# Patient Record
Sex: MALE | Race: WHITE | Employment: FULL TIME | URBAN - METROPOLITAN AREA
[De-identification: names, ages, dates, MRNs, and addresses within clinical notes are randomized per-mention and may not be internally consistent; named-entity substitution may affect disease eponyms.]

---

## 2017-06-05 ENCOUNTER — GENERIC CONVERSION - ENCOUNTER (OUTPATIENT)
Dept: OTHER | Facility: OTHER | Age: 42
End: 2017-06-05

## 2017-06-06 ENCOUNTER — GENERIC CONVERSION - ENCOUNTER (OUTPATIENT)
Dept: OTHER | Facility: OTHER | Age: 42
End: 2017-06-06

## 2017-07-03 ENCOUNTER — ALLSCRIPTS OFFICE VISIT (OUTPATIENT)
Dept: OTHER | Facility: OTHER | Age: 42
End: 2017-07-03

## 2017-07-03 DIAGNOSIS — Z13.6 ENCOUNTER FOR SCREENING FOR CARDIOVASCULAR DISORDERS: ICD-10-CM

## 2017-09-11 DIAGNOSIS — E78.2 MIXED HYPERLIPIDEMIA: ICD-10-CM

## 2017-09-21 ENCOUNTER — ALLSCRIPTS OFFICE VISIT (OUTPATIENT)
Dept: OTHER | Facility: OTHER | Age: 42
End: 2017-09-21

## 2017-11-13 ENCOUNTER — ALLSCRIPTS OFFICE VISIT (OUTPATIENT)
Dept: OTHER | Facility: OTHER | Age: 42
End: 2017-11-13

## 2017-11-14 NOTE — PROGRESS NOTES
Assessment    1  Vertigo (780 4) (R42)    Plan  Vertigo    · PredniSONE 10 MG Oral Tablet; TAKE 6 TABLETS TODAY, THEN DECREASE BY 1TABLET EACH DAY UNTIL GONE    Discussion/Summary    Call in one week if not improving  The patient was counseled regarding diagnostic results,-- instructions for management,-- prognosis  Possible side effects of new medications were reviewed with the patient/guardian today  The treatment plan was reviewed with the patient/guardian  The patient/guardian understands and agrees with the treatment plan      Chief Complaint    1  Dizziness  Patient is in the office today complaining of having dizziness      History of Present Illness  Vertigo (Brief): The patient is being seen for an initial evaluation of vertigo  The patient presents with complaints of gradual onset of intermittent episodes of moderate sensation of movement  Episodes started 1 week ago  Symptoms are improving  The patient is currently experiencing symptoms  No associated symptoms are reported  Review of Systems   Constitutional: no fever or chills, feels well, no tiredness, no recent weight loss or weight gain  ENT: no complaints of earache, no loss of hearing, no nosebleeds or nasal discharge, no sore throat or hoarseness  Cardiovascular: no complaints of slow or fast heart rate, no chest pain, no palpitations, no leg claudication or lower extremity edema  Respiratory: no complaints of shortness of breath, no wheezing or cough, no dyspnea on exertion, no orthopnea or PND  Gastrointestinal: no complaints of abdominal pain, no constipation, no nausea or vomiting, no diarrhea or bloody stools  Genitourinary: no complaints of dysuria or incontinence, no hesitancy, no nocturia, no genital lesion, no inadequacy of penile erection  Musculoskeletal: no complaints of arthralgia, no myalgia, no joint swelling or stiffness, no limb pain or swelling  Neurological: as noted in HPI  Active Problems  1   Acute bronchitis, unspecified organism (466 0) (J20 9)   2  Allergic rhinitis (477 9) (J30 9)   3  Arthralgia (719 40) (M25 50)   4  Back pain (724 5) (M54 9)   5  Degeneration, intervertebral disc, lumbar (722 52) (M51 36)   6  Hyperlipidemia, mixed (272 2) (E78 2)   7  Laryngitis, acute (464 00) (J04 0)   8  Lumbar radiculopathy (724 4) (M54 16)    Past Medical History  1  History of Acute maxillary sinusitis, unspecified (461 0) (J01 00)   2  History of Acute URI (465 9) (J06 9)   3  History of Cellulitis of toe (681 10) (L03 039)   4  History of acute bronchitis (V12 69) (Z87 09)   5  History of acute pharyngitis (V12 69) (Z87 09)   6  History of influenza vaccination (V49 89) (Z92 29)   7  History of upper respiratory infection (V12 09) (Z87 09)   8  History of Need for Tdap vaccination (V06 1) (Z23)   9  History of Recurrent acute serous otitis media of both ears (381 01) (H65 06)   10  History of Right foot pain (729 5) (M79 671)   11  History of Screening for cardiovascular condition (V81 2) (Z13 6)   12  History of Screening for diabetes mellitus (V77 1) (Z13 1)   13  History of Screening for hyperlipidemia (V77 91) (Z13 220)   14  History of Sinusitis (473 9) (J32 9)   15  History of Temporomandibular joint tender (524 62) (M26 629)    Family History  Mother    1  Denied: Family history of mental disorder   2  Denied: Family history of substance abuse  Father    3  Family history of Diabetes   4  Denied: Family history of mental disorder   5  Denied: Family history of substance abuse  Sister    6  Family history of Colon cancer    Social History   · Consumes alcohol occasionally (V49 89) (Z78 9)   · Denied: History of Drug use   · Never a smoker   · Never smoked    Surgical History    1  History of Inguinal Hernia Repair   2  History of Surgery Vas Deferens Vasectomy    Current Meds   1  Advil 200 MG Oral Tablet; 2 pills 3 times daily; Therapy: 35SBM7010 to (Last Rx:[id number]) Ordered   2   Fluticasone Propionate 50 MCG/ACT Nasal Suspension; use 2 sprays in each nostril once daily; Therapy: 23Voo5063 to [phone])  Requested for: 49WJC6418; Last Rx:2017 Ordered    Allergies    1  No Known Drug Allergies    Vitals   ** Printed in Appendix #1 below  Physical Exam   Constitutional  General appearance: No acute distress, well appearing and well nourished  Ears, Nose, Mouth, and Throat  Oropharynx: Normal with no erythema, edema, exudate or lesions  Pulmonary  Auscultation of lungs: Clear to auscultation, equal breath sounds bilaterally, no wheezes, no rales, no rhonci  Cardiovascular  Auscultation of heart: Normal rate and rhythm, normal S1 and S2, without murmurs  Examination of extremities for edema and/or varicosities: Normal    Abdomen  Abdomen: Non-tender, no masses  Musculoskeletal  Gait and station: Normal    Neurologic  Cranial nerves: Cranial nerves 2-12 intact     Psychiatric  Orientation to person, place and time: Normal    Mood and affect: Normal          Signatures   Electronically signed by : Ga Qureshi DO; 2017  1:46PM EST                       (Author)    Appendix #1  Patient: Rosalva Leon; : 1975; MRN: [id number]   Recorded: 01IUP1644 01:34PM Recorded: 41ZMA6724 01:33PM Recorded: 68OEA1069 01:30PM   Temperature   97 3 F   Heart Rate 91 95 85   Respiration   15   Systolic 700, LUE, Sitting 108, LUE, Standing 118, LUE, Supine   Diastolic 74, LUE, Sitting 76, LUE, Standing 76, LUE, Supine   Height   6 ft 1 in   Weight   193 lb 4 oz   BMI Calculated   25 5   BSA Calculated   2 12   O2 Saturation 97 98 97

## 2017-12-28 ENCOUNTER — ALLSCRIPTS OFFICE VISIT (OUTPATIENT)
Dept: OTHER | Facility: OTHER | Age: 42
End: 2017-12-28

## 2017-12-29 NOTE — PROGRESS NOTES
Assessment   1  Acute infection of nasal sinus (461 9) (J01 90)    Plan   Acute infection of nasal sinus    · Amoxicillin-Pot Clavulanate 875-125 MG Oral Tablet; TAKE 1 TABLET EVERY 12    HOURS DAILY    Discussion/Summary      Patient is to continue Flonase and Mucinex  He is to also use saline nasal spray through the day  He is to start Augmentin twice a day for 10 days  If he is not improving he is to follow up  The patient was counseled regarding instructions for management,-- risk factor reductions,-- patient and family education,-- impressions,-- risks and benefits of treatment options,-- importance of compliance with treatment  Educational resources provided:    Possible side effects of new medications were reviewed with the patient/guardian today  The treatment plan was reviewed with the patient/guardian  The patient/guardian understands and agrees with the treatment plan       Self Referrals: No      Chief Complaint   Pt in office today c/o body aches, cough, nasal congestion, sore throat, and left ear pain  History of Present Illness   HPI: 50-year-old male comes in today complaining of congestion, cough, sore throat and body aches  He has had the symptoms for almost a week now  He has been using his Flonase nasal spray, Mucinex, and Advil  Sinusitis (Brief): The patient is being seen for an initial evaluation of sinusitis  The sinusitis involves the maxillary sinuses and the frontal sinuses  The patient is currently experiencing symptoms  facial pressure headache nasal congestion no clear rhinorrhea no purulent rhinorrhea postnasal drainage      Associated symptoms:  chills,-- ear fullness,-- sore throat-- and-- cough, but-- no fever,-- no nausea-- and-- no neck stiffness  Review of Systems        Constitutional: as noted in HPI       ENT: as noted in HPI  Respiratory: as noted in HPI  ROS reviewed  Active Problems   1  Allergic rhinitis (787 9) (J30 9)   2   Arthralgia (719 40) (M25 50)   3  Degeneration, intervertebral disc, lumbar (722 52) (M51 36)   4  Hyperlipidemia, mixed (272 2) (E78 2)   5  Lumbar radiculopathy (724 4) (M54 16)   6  Vertigo (780 4) (R42)    Past Medical History   1  History of Acute maxillary sinusitis, unspecified (461 0) (J01 00)   2  History of Acute URI (465 9) (J06 9)   3  History of Cellulitis of toe (681 10) (L03 039)   4  History of acute bronchitis (V12 69) (Z87 09)   5  History of acute bronchitis (V12 69) (Z87 09)   6  History of acute pharyngitis (V12 69) (Z87 09)   7  History of back pain (V13 59) (Z87 39)   8  History of influenza vaccination (V49 89) (Z92 29)   9  History of upper respiratory infection (V12 09) (Z87 09)   10  History of Laryngitis, acute (464 00) (J04 0)   11  History of Need for Tdap vaccination (V06 1) (Z23)   12  History of Recurrent acute serous otitis media of both ears (381 01) (H65 06)   13  History of Right foot pain (729 5) (M79 671)   14  History of Screening for cardiovascular condition (V81 2) (Z13 6)   15  History of Screening for diabetes mellitus (V77 1) (Z13 1)   16  History of Screening for hyperlipidemia (V77 91) (Z13 220)   17  History of Sinusitis (473 9) (J32 9)   18  History of Temporomandibular joint tender (524 62) (M26 629)  Active Problems And Past Medical History Reviewed: The active problems and past medical history were reviewed and updated today  Family History   Mother    1  Denied: Family history of mental disorder   2  Denied: Family history of substance abuse  Father    3  Family history of Diabetes   4  Denied: Family history of mental disorder   5  Denied: Family history of substance abuse  Sister    6  Family history of Colon cancer    Social History    · Consumes alcohol occasionally (V49 89) (Z78 9)   · Denied: History of Drug use   · Never a smoker   · Never smoked  The social history was reviewed and is unchanged  Surgical History   1   History of Inguinal Hernia Repair 2  History of Surgery Vas Deferens Vasectomy  Surgical History Reviewed: The surgical history was reviewed and updated today  Current Meds    1  Advil 200 MG Oral Tablet; 2 pills 3 times daily; Therapy: 62XDR7328 to (Last Rx:[id number]) Ordered   2  Fluticasone Propionate 50 MCG/ACT Nasal Suspension; use 2 sprays in each nostril     once daily; Therapy: 92Ekl3919 to (Evaluate:18Feb2018)  Requested for: 80GKK4540; Last     Rx:[id number] Ordered     The medication list was reviewed and updated today  Allergies   1  No Known Drug Allergies    Vitals    Recorded: 28Dec2017 11:25AM   Temperature 97 2 F   Heart Rate 78   Systolic 772   Diastolic 88   Height 6 ft 1 in   Weight 195 lb    BMI Calculated 25 73   BSA Calculated 2 13   O2 Saturation 98     Physical Exam        Constitutional      General appearance: Abnormal   acutely ill  Ears, Nose, Mouth, and Throat      External inspection of ears and nose: Abnormal  -- Frontal and maxillary sinuses are tender  Otoscopic examination: Tympanic membrance translucent with normal light reflex  Canals patent without erythema  Nasal mucosa, septum, and turbinates: Abnormal  -- Edematous and erythematous  Oropharynx: Normal with no erythema, edema, exudate or lesions  Pulmonary      Respiratory effort: No increased work of breathing or signs of respiratory distress  Auscultation of lungs: Clear to auscultation, equal breath sounds bilaterally, no wheezes, no rales, no rhonci  Cardiovascular      Auscultation of heart: Normal rate and rhythm, normal S1 and S2, without murmurs            Signatures    Electronically signed by : Liya Carlson [facility]; Dec 28 2017 11:49AM EST                       (Author)     Electronically signed by : ISRAEL Paz ; Dec 28 2017  1:14PM EST

## 2018-01-12 NOTE — PROGRESS NOTES
Assessment    1  Encounter for preventive health examination (V70 0) (Z00 00)   2  Degeneration, intervertebral disc, lumbar (722 52) (M51 36)   3  Screening for hyperlipidemia (V77 91) (Z13 220)   4  Screening for diabetes mellitus (V77 1) (Z13 1)    Plan  Screening for diabetes mellitus, Screening for hyperlipidemia    · (1) COMPREHENSIVE METABOLIC PANEL; Status:Active; Requested for:07Apr2016;    · (1) LIPID PANEL, FASTING; Status:Active; Requested for:07Apr2016;     Discussion/Summary  Impression: health maintenance visit  Currently, he eats a healthy diet  Prostate cancer screening: PSA is not indicated  Testicular cancer screening: the risks and benefits of testicular cancer screening were discussed  Colorectal cancer screening: colorectal cancer screening is current  Screening lab work includes glucose and lipid profile  History of Present Illness  HM, Adult Male: The patient is being seen for a health maintenance evaluation  General Health: The patient's health since the last visit is described as good  He has regular dental visits  He denies vision problems  He denies hearing loss  Immunizations status: up to date  Lifestyle:  He consumes a diverse and healthy diet  He does not have any weight concerns  He exercises regularly  He does not use tobacco  He denies alcohol use  He denies drug use  Screening: cancer screening reviewed and current  metabolic screening reviewed and current  risk screening reviewed and current  Review of Systems    Constitutional: No fever or chills, feels well, no tiredness, no recent weight gain or weight loss  Eyes: No complaints of eye pain, no red eyes, no discharge from eyes, no itchy eyes  ENT: no complaints of earache, no hearing loss, no nosebleeds, no nasal discharge, no sore throat, no hoarseness  Cardiovascular: No complaints of slow heart rate, no fast heart rate, no chest pain, no palpitations, no leg claudication, no lower extremity  Respiratory: No complaints of shortness of breath, no wheezing, no cough, no SOB on exertion, no orthopnea or PND  Gastrointestinal: No complaints of abdominal pain, no constipation, no nausea or vomiting, no diarrhea or bloody stools  Genitourinary: No complaints of dysuria, no incontinence, no hesitancy, no nocturia, no genital lesion, no testicular pain  Musculoskeletal: No complaints of arthralgia, no myalgias, no joint swelling or stiffness, no limb pain or swelling  Integumentary: No complaints of skin rash or skin lesions, no itching, no skin wound, no dry skin  Neurological: No compliants of headache, no confusion, no convulsions, no numbness or tingling, no dizziness or fainting, no limb weakness, no difficulty walking  Psychiatric: Is not suicidal, no sleep disturbances, no anxiety or depression, no change in personality, no emotional problems  Endocrine: No complaints of proptosis, no hot flashes, no muscle weakness, no erectile dysfunction, no deepening of the voice, no feelings of weakness  Hematologic/Lymphatic: No complaints of swollen glands, no swollen glands in the neck, does not bleed easily, no easy bruising  Active Problems    1  Acute pharyngitis, unspecified etiology (462) (J02 9)   2  Allergic rhinitis (477 9) (J30 9)   3  Arthralgia (719 40) (M25 50)   4  Back pain (724 5) (M54 9)   5  Bronchitis, acute (466 0) (J20 9)   6  Cellulitis of toe (681 10) (L03 039)   7  Degeneration, intervertebral disc, lumbar (722 52) (M51 36)   8  Lumbar radiculopathy (724 4) (M54 16)   9  Recurrent acute serous otitis media of both ears (381 01) (H65 06)   10  Right foot pain (729 5) (M79 671)   11  Sinusitis, acute, maxillary (461 0) (J01 00)   12   URI (upper respiratory infection) (465 9) (J06 9)    Past Medical History    · History of Sinusitis (473 9) (J32 9)    Surgical History    · History of Inguinal Hernia Repair   · History of Surgery Vas Deferens Vasectomy    Family History · Denied: Family history of mental disorder   · Denied: Family history of substance abuse    · Family history of Diabetes   · Denied: Family history of mental disorder   · Denied: Family history of substance abuse    · Family history of Colon cancer    Social History    · Consumes alcohol occasionally (V49 89) (Z78 9)   · Denied: History of Drug use   · Never a smoker   · Never smoked    Current Meds   1  Fluticasone Propionate 50 MCG/ACT Nasal Suspension; use 2 sprays in each nostril   once daily; Therapy: 12Toe2171 to (Evaluate:20Apr2016)  Requested for: 03Gbx9191; Last   Rx:[id number] Ordered    Allergies    1  No Known Drug Allergies    Vitals   Recorded: 07Apr2016 02:59PM   Temperature 98 2 F   Heart Rate 91   Systolic 440   Diastolic 70   Weight 790 lb    O2 Saturation 98     Physical Exam    Constitutional   General appearance: No acute distress, well appearing and well nourished  Eyes   Conjunctiva and lids: No erythema, swelling or discharge  Pupils and irises: Equal, round, reactive to light  Ophthalmoscopic examination: Normal fundi and optic discs  Ears, Nose, Mouth, and Throat   External inspection of ears and nose: Normal     Otoscopic examination: Tympanic membranes translucent with normal light reflex  Canals patent without erythema  Hearing: Normal     Nasal mucosa, septum, and turbinates: Normal without edema or erythema  Lips, teeth, and gums: Normal, good dentition  Oropharynx: Normal with no erythema, edema, exudate or lesions  Neck   Neck: Supple, symmetric, trachea midline, no masses  Thyroid: Normal, no thyromegaly  Pulmonary   Respiratory effort: No increased work of breathing or signs of respiratory distress  Percussion of chest: Normal     Palpation of chest: Normal     Auscultation of lungs: Clear to auscultation  Cardiovascular   Palpation of heart: Normal PMI, no thrills  Auscultation of heart: Normal rate and rhythm, normal S1 and S2, no murmurs  Carotid pulses: 2+ bilaterally  Abdominal aorta: Normal     Femoral pulses: 2+ bilaterally  Pedal pulses: 2+ bilaterally  Examination of extremities for edema and/or varicosities: Normal     Chest   Breasts: Normal, no dimpling or skin changes appreciated  Palpation of breasts and axillae: Normal, no masses palpated  Chest: Normal     Abdomen   Abdomen: Non-tender, no masses  Liver and spleen: No hepatomegaly or splenomegaly  Lymphatic   Palpation of lymph nodes in neck: No lymphadenopathy  Palpation of lymph nodes in axillae: No lymphadenopathy  Musculoskeletal   Gait and station: Normal     Inspection/palpation of digits and nails: Normal without clubbing or cyanosis  Inspection/palpation of joints, bones, and muscles: Normal     Range of motion: Normal     Stability: Normal     Muscle strength/tone: Normal     Skin   Skin and subcutaneous tissue: Normal without rashes or lesions  Palpation of skin and subcutaneous tissue: Normal turgor  Neurologic   Cranial nerves: Cranial nerves 2-12 intact  Reflexes: 2+ and symmetric  Sensation: No sensory loss  Psychiatric   Judgment and insight: Normal     Orientation to person, place and time: Normal     Recent and remote memory: Intact  Mood and affect: Normal        Signatures   Electronically signed by : Janessa Camacho DO;  Apr 7 2016  3:21PM EST                       (Author)

## 2018-01-13 VITALS
HEART RATE: 86 BPM | BODY MASS INDEX: 25.71 KG/M2 | WEIGHT: 194 LBS | SYSTOLIC BLOOD PRESSURE: 116 MMHG | OXYGEN SATURATION: 96 % | HEIGHT: 73 IN | DIASTOLIC BLOOD PRESSURE: 78 MMHG

## 2018-01-14 VITALS
HEART RATE: 91 BPM | WEIGHT: 193.25 LBS | TEMPERATURE: 97.3 F | OXYGEN SATURATION: 97 % | HEIGHT: 73 IN | SYSTOLIC BLOOD PRESSURE: 118 MMHG | RESPIRATION RATE: 15 BRPM | DIASTOLIC BLOOD PRESSURE: 74 MMHG | BODY MASS INDEX: 25.61 KG/M2

## 2018-01-15 NOTE — PROGRESS NOTES
Assessment    1  Encounter for preventive health examination (V70 0) (Z00 00)   2  Need for Tdap vaccination (V06 1) (Z23)    Plan  Need for Tdap vaccination    · Tdap (Adacel); INJECT 0 5  ML Intramuscular; To Be Done: [id number]  Screening for cardiovascular condition    · (1) COMPREHENSIVE METABOLIC PANEL; Status:Active; Requested for:40Mgv8104;    · (1) LIPID PANEL, FASTING; Status:Active; Requested for:41Hfx0612;     Discussion/Summary  Impression: health maintenance visit  Currently, he eats a healthy diet  Prostate cancer screening: PSA is not indicated  Testicular cancer screening: the risks and benefits of testicular cancer screening were discussed  Colorectal cancer screening: colorectal cancer screening is not indicated  Screening lab work includes glucose and lipid profile  The risks and benefits of immunizations were discussed  Advice and education were given regarding weight loss and cardiovascular risk reduction  Patient discussion: discussed with the patient  History of Present Illness  HM, Adult Male: The patient is being seen for a health maintenance evaluation  General Health: The patient's health since the last visit is described as good  He has regular dental visits  He denies vision problems  He denies hearing loss  Immunizations status: up to date  Lifestyle:  He consumes a diverse and healthy diet  He does not have any weight concerns  He exercises regularly  He does not use tobacco  He denies alcohol use  He denies drug use  Screening: cancer screening reviewed and current  metabolic screening reviewed and current  risk screening reviewed and current  Review of Systems    Constitutional: No fever or chills, feels well, no tiredness, no recent weight gain or weight loss  Eyes: No complaints of eye pain, no red eyes, no discharge from eyes, no itchy eyes  ENT: no complaints of earache, no hearing loss, no nosebleeds, no nasal discharge, no sore throat, no hoarseness  Cardiovascular: No complaints of slow heart rate, no fast heart rate, no chest pain, no palpitations, no leg claudication, no lower extremity  Respiratory: No complaints of shortness of breath, no wheezing, no cough, no SOB on exertion, no orthopnea or PND  Gastrointestinal: No complaints of abdominal pain, no constipation, no nausea or vomiting, no diarrhea or bloody stools  Genitourinary: No complaints of dysuria, no incontinence, no hesitancy, no nocturia, no genital lesion, no testicular pain  Musculoskeletal: No complaints of arthralgia, no myalgias, no joint swelling or stiffness, no limb pain or swelling  Integumentary: No complaints of skin rash or skin lesions, no itching, no skin wound, no dry skin  Neurological: No compliants of headache, no confusion, no convulsions, no numbness or tingling, no dizziness or fainting, no limb weakness, no difficulty walking  Psychiatric: Is not suicidal, no sleep disturbances, no anxiety or depression, no change in personality, no emotional problems  Endocrine: No complaints of proptosis, no hot flashes, no muscle weakness, no erectile dysfunction, no deepening of the voice, no feelings of weakness  Hematologic/Lymphatic: No complaints of swollen glands, no swollen glands in the neck, does not bleed easily, no easy bruising  Active Problems    1  Acute maxillary sinusitis, unspecified (461 0) (J01 00)   2  Acute pharyngitis, unspecified etiology (462) (J02 9)   3  Acute URI (465 9) (J06 9)   4  Allergic rhinitis (477 9) (J30 9)   5  Arthralgia (719 40) (M25 50)   6  Back pain (724 5) (M54 9)   7  Bronchitis, acute (466 0) (J20 9)   8  Cellulitis of toe (681 10) (L03 039)   9  Degeneration, intervertebral disc, lumbar (722 52) (M51 36)   10  Lumbar radiculopathy (724 4) (M54 16)   11  Need for prophylactic vaccination and inoculation against influenza (V04 81) (Z23)   12  Recurrent acute serous otitis media of both ears (381 01) (H65 06)   13  Right foot pain (729 5) (M79 671)   14  Screening for diabetes mellitus (V77 1) (Z13 1)   15  Screening for hyperlipidemia (V77 91) (Z13 220)   16  Temporomandibular joint tender (524 62) (M26 629)   17  URI (upper respiratory infection) (465 9) (J06 9)    Past Medical History    · History of Sinusitis (473 9) (J32 9)    Surgical History    · History of Inguinal Hernia Repair   · History of Surgery Vas Deferens Vasectomy    Family History  Mother    · Denied: Family history of mental disorder   · Denied: Family history of substance abuse  Father    · Family history of Diabetes   · Denied: Family history of mental disorder   · Denied: Family history of substance abuse  Sister    · Family history of Colon cancer    Social History    · Consumes alcohol occasionally (V49 89) (Z78 9)   · Denied: History of Drug use   · Never a smoker   · Never smoked    Current Meds   1  Advil 200 MG Oral Tablet; 2 pills 3 times daily; Therapy: 94IAB0952 to (Last Rx:77Apc4949) Ordered   2  Fluticasone Propionate 50 MCG/ACT Nasal Suspension; use 2 sprays in each nostril   once daily; Therapy: 90Naf9972 to (Evaluate:24Apr2017)  Requested for: 90UBB0998; Last   Rx:24Jan2017 Ordered    Allergies    1  No Known Drug Allergies    Vitals   Recorded: 63RDQ1324 10:35AM   Temperature 97 3 F   Heart Rate 80   Systolic 149   Diastolic 80   Weight 330 lb 6 oz   BMI Calculated 26 53   BSA Calculated 2 13   O2 Saturation 96     Physical Exam    Constitutional   General appearance: No acute distress, well appearing and well nourished  Eyes   Conjunctiva and lids: No erythema, swelling or discharge  Pupils and irises: Equal, round, reactive to light  Ophthalmoscopic examination: Normal fundi and optic discs  Ears, Nose, Mouth, and Throat   External inspection of ears and nose: Normal     Otoscopic examination: Tympanic membranes translucent with normal light reflex  Canals patent without erythema      Hearing: Normal     Nasal mucosa, septum, and turbinates: Normal without edema or erythema  Lips, teeth, and gums: Normal, good dentition  Oropharynx: Normal with no erythema, edema, exudate or lesions  Neck   Neck: Supple, symmetric, trachea midline, no masses  Thyroid: Normal, no thyromegaly  Pulmonary   Respiratory effort: No increased work of breathing or signs of respiratory distress  Percussion of chest: Normal     Palpation of chest: Normal     Auscultation of lungs: Clear to auscultation  Cardiovascular   Palpation of heart: Normal PMI, no thrills  Auscultation of heart: Normal rate and rhythm, normal S1 and S2, no murmurs  Carotid pulses: 2+ bilaterally  Abdominal aorta: Normal     Femoral pulses: 2+ bilaterally  Pedal pulses: 2+ bilaterally  Examination of extremities for edema and/or varicosities: Normal     Chest   Breasts: Normal, no dimpling or skin changes appreciated  Palpation of breasts and axillae: Normal, no masses palpated  Chest: Normal     Abdomen   Abdomen: Non-tender, no masses  Liver and spleen: No hepatomegaly or splenomegaly  Examination for hernias: No hernias appreciated  Lymphatic   Palpation of lymph nodes in neck: No lymphadenopathy  Palpation of lymph nodes in axillae: No lymphadenopathy  Musculoskeletal   Gait and station: Normal     Inspection/palpation of digits and nails: Normal without clubbing or cyanosis  Inspection/palpation of joints, bones, and muscles: Normal     Range of motion: Normal     Stability: Normal     Muscle strength/tone: Normal     Skin   Skin and subcutaneous tissue: Normal without rashes or lesions  Palpation of skin and subcutaneous tissue: Normal turgor  Neurologic   Cranial nerves: Cranial nerves 2-12 intact  Reflexes: 2+ and symmetric  Sensation: No sensory loss  Psychiatric   Judgment and insight: Normal     Orientation to person, place and time: Normal     Recent and remote memory: Intact      Mood and affect: Normal        Results/Data  PHQ-9 Adult Depression Screening 60Qsx5205 10:43AM User, Ahs     Test Name Result Flag Reference   PHQ-9 Adult Depression Score 0     Over the last two weeks, how often have you been bothered by any of the following problems? Little interest or pleasure in doing things: Not at all - 0  Feeling down, depressed, or hopeless: Not at all - 0  Trouble falling or staying asleep, or sleeping too much: Not at all - 0  Feeling tired or having little energy: Not at all - 0  Poor appetite or over eating: Not at all - 0  Feeling bad about yourself - or that you are a failure or have let yourself or your family down: Not at all - 0  Trouble concentrating on things, such as reading the newspaper or watching television: Not at all - 0  Moving or speaking so slowly that other people could have noticed   Or the opposite -  being so fidgety or restless that you have been moving around a lot more than usual: Not at all - 0  Thoughts that you would be better off dead, or of hurting yourself in some way: Not at all - 0   PHQ-9 Adult Depression Screening Negative     PHQ-9 Difficulty Level Not difficult at all     PHQ-9 Severity No Depression         Signatures   Electronically signed by : Melissa Montez DO; Jul  3 2017 11:14AM EST                       (Author)

## 2018-01-22 VITALS
BODY MASS INDEX: 26.54 KG/M2 | DIASTOLIC BLOOD PRESSURE: 80 MMHG | TEMPERATURE: 97.3 F | SYSTOLIC BLOOD PRESSURE: 126 MMHG | HEART RATE: 80 BPM | WEIGHT: 198.38 LBS | OXYGEN SATURATION: 96 %

## 2018-01-22 VITALS
DIASTOLIC BLOOD PRESSURE: 88 MMHG | HEART RATE: 78 BPM | TEMPERATURE: 97.2 F | SYSTOLIC BLOOD PRESSURE: 122 MMHG | BODY MASS INDEX: 25.84 KG/M2 | OXYGEN SATURATION: 98 % | WEIGHT: 195 LBS | HEIGHT: 73 IN

## 2018-09-24 ENCOUNTER — OFFICE VISIT (OUTPATIENT)
Dept: FAMILY MEDICINE CLINIC | Facility: CLINIC | Age: 43
End: 2018-09-24
Payer: COMMERCIAL

## 2018-09-24 VITALS
OXYGEN SATURATION: 96 % | RESPIRATION RATE: 17 BRPM | BODY MASS INDEX: 26.37 KG/M2 | WEIGHT: 199 LBS | SYSTOLIC BLOOD PRESSURE: 120 MMHG | DIASTOLIC BLOOD PRESSURE: 76 MMHG | HEIGHT: 73 IN | TEMPERATURE: 97 F | HEART RATE: 88 BPM

## 2018-09-24 DIAGNOSIS — J30.1 SEASONAL ALLERGIC RHINITIS DUE TO POLLEN: Primary | ICD-10-CM

## 2018-09-24 DIAGNOSIS — J06.9 VIRAL URI WITH COUGH: Primary | ICD-10-CM

## 2018-09-24 PROBLEM — E78.2 HYPERLIPIDEMIA, MIXED: Status: ACTIVE | Noted: 2017-09-11

## 2018-09-24 PROCEDURE — 3008F BODY MASS INDEX DOCD: CPT | Performed by: NURSE PRACTITIONER

## 2018-09-24 PROCEDURE — 99213 OFFICE O/P EST LOW 20 MIN: CPT | Performed by: NURSE PRACTITIONER

## 2018-09-24 RX ORDER — BROMPHENIRAMINE MALEATE, PSEUDOEPHEDRINE HYDROCHLORIDE, AND DEXTROMETHORPHAN HYDROBROMIDE 2; 30; 10 MG/5ML; MG/5ML; MG/5ML
SYRUP ORAL
Qty: 120 ML | Refills: 0 | Status: SHIPPED | OUTPATIENT
Start: 2018-09-24 | End: 2019-01-30 | Stop reason: ALTCHOICE

## 2018-09-24 RX ORDER — FLUTICASONE PROPIONATE 50 MCG
2 SPRAY, SUSPENSION (ML) NASAL DAILY
Qty: 16 G | Refills: 3 | Status: SHIPPED | OUTPATIENT
Start: 2018-09-24 | End: 2019-05-14 | Stop reason: SDUPTHER

## 2018-09-24 NOTE — PROGRESS NOTES
Assessment/Plan:    Viral URI with cough  To begin Bromfed DM as needed for cough  Counseled on increasing fluid intake, beginning sinus rinses, and using a cool mist humidifier nighttime  To call office if symptoms do not begin to improve in 1 week or sooner if worsen  Follow-up as needed  Diagnoses and all orders for this visit:    Viral URI with cough  -     brompheniramine-pseudoephedrine-DM 30-2-10 MG/5ML syrup; Take 10 mL every 4 hours as needed for cough and congestion  Max 40 mL/day          Subjective:      Patient ID: Paloma Hazel is a 39 y o  male  Madeleine Potter presents reporting nasal congestion and a dry cough that started today  He is also having hoarseness associated with his symptoms  Denies fever, chills, shortness of breath, sore throat, or ear pain  He took 2 Advil last evening as he felt himself becoming run down  The following portions of the patient's history were reviewed and updated as appropriate: He   Patient Active Problem List    Diagnosis Date Noted    Viral URI with cough 09/24/2018    Hyperlipidemia, mixed 09/11/2017     Current Outpatient Prescriptions   Medication Sig Dispense Refill    fluticasone (FLONASE) 50 mcg/act nasal spray 2 sprays into each nostril daily 16 g 3    ibuprofen (ADVIL) 200 mg tablet Take by mouth      brompheniramine-pseudoephedrine-DM 30-2-10 MG/5ML syrup Take 10 mL every 4 hours as needed for cough and congestion  Max 40 mL/day 120 mL 0     No current facility-administered medications for this visit  He has No Known Allergies         Review of Systems   Constitutional: Negative  HENT: Positive for congestion, rhinorrhea and voice change  Respiratory: Positive for cough  Cardiovascular: Negative  Neurological: Negative  Psychiatric/Behavioral: Negative            /76   Pulse 88   Temp (!) 97 °F (36 1 °C)   Resp 17   Ht 6' 0 5" (1 842 m)   Wt 90 3 kg (199 lb)   SpO2 96%   BMI 26 62 kg/m² Objective:     Physical Exam   Constitutional: He is oriented to person, place, and time  He appears well-developed and well-nourished  HENT:   Head: Normocephalic and atraumatic  Right Ear: Tympanic membrane normal    Left Ear: Tympanic membrane normal    Nose: Mucosal edema and rhinorrhea present  Right sinus exhibits no maxillary sinus tenderness and no frontal sinus tenderness  Left sinus exhibits no maxillary sinus tenderness and no frontal sinus tenderness  Mouth/Throat: Mucous membranes are normal  Posterior oropharyngeal erythema present  No oropharyngeal exudate or posterior oropharyngeal edema  Eyes: Conjunctivae are normal    Neck: Neck supple  Cardiovascular: Normal rate, regular rhythm and normal heart sounds  No murmur heard  Pulmonary/Chest: Effort normal and breath sounds normal  No respiratory distress  He has no wheezes  He has no rales  He exhibits no tenderness  Lymphadenopathy:     He has no cervical adenopathy  Neurological: He is alert and oriented to person, place, and time  Psychiatric: He has a normal mood and affect  His behavior is normal  Judgment and thought content normal    Nursing note and vitals reviewed

## 2018-09-24 NOTE — ASSESSMENT & PLAN NOTE
To begin Bromfed DM as needed for cough  Counseled on increasing fluid intake, beginning sinus rinses, and using a cool mist humidifier nighttime  To call office if symptoms do not begin to improve in 1 week or sooner if worsen  Follow-up as needed

## 2018-11-12 ENCOUNTER — OFFICE VISIT (OUTPATIENT)
Dept: FAMILY MEDICINE CLINIC | Facility: CLINIC | Age: 43
End: 2018-11-12
Payer: COMMERCIAL

## 2018-11-12 ENCOUNTER — TELEPHONE (OUTPATIENT)
Dept: OTHER | Facility: OTHER | Age: 43
End: 2018-11-12

## 2018-11-12 VITALS
SYSTOLIC BLOOD PRESSURE: 104 MMHG | HEIGHT: 73 IN | TEMPERATURE: 100 F | DIASTOLIC BLOOD PRESSURE: 72 MMHG | HEART RATE: 100 BPM | WEIGHT: 203 LBS | OXYGEN SATURATION: 97 % | BODY MASS INDEX: 26.9 KG/M2

## 2018-11-12 DIAGNOSIS — B34.9 VIRAL INFECTION: Primary | ICD-10-CM

## 2018-11-12 PROCEDURE — 99213 OFFICE O/P EST LOW 20 MIN: CPT | Performed by: PHYSICIAN ASSISTANT

## 2018-11-12 RX ORDER — OSELTAMIVIR PHOSPHATE 75 MG/1
75 CAPSULE ORAL EVERY 12 HOURS SCHEDULED
Qty: 10 CAPSULE | Refills: 0 | Status: SHIPPED | OUTPATIENT
Start: 2018-11-12 | End: 2018-11-17

## 2018-11-12 NOTE — PROGRESS NOTES
Assessment/Plan:       Diagnoses and all orders for this visit:    Viral infection  -     oseltamivir (TAMIFLU) 75 mg capsule; Take 1 capsule (75 mg total) by mouth every 12 (twelve) hours for 5 days            Subjective:      Patient ID: Clinton Blackman is a 39 y o  male  Generalized Body Aches   The current episode started yesterday  The problem occurs constantly  The problem has been waxing and waning since onset  Nothing aggravates the symptoms  Associated symptoms include congestion, headaches, fatigue, a fever, coughing and muscle aches  Pertinent negatives include no ear pain, eye discharge, eye pain, eye redness, mouth sores, photophobia, rhinorrhea, sore throat, stridor, swollen glands, chest pain, shortness of breath, wheezing, abdominal pain, nausea, joint pain, joint swelling, neck stiffness or rash  Past treatments include one or more OTC medications  The treatment provided no relief  The fever has been present for less than 1 day  His temperature was unmeasured prior to arrival  The cough has no precipitants  Cough   This is a new problem  The current episode started yesterday  The problem has been waxing and waning  The problem occurs every few minutes  The cough is non-productive  Associated symptoms include chills, a fever, headaches and myalgias  Pertinent negatives include no chest pain, ear congestion, ear pain, eye redness, postnasal drip, rash, rhinorrhea, sore throat, shortness of breath or wheezing  The symptoms are aggravated by other  He has tried OTC cough suppressant for the symptoms  The treatment provided no relief  The following portions of the patient's history were reviewed and updated as appropriate:   He has a past medical history of Temporomandibular joint tender  ,   does not have any pertinent problems on file  ,   has a past surgical history that includes Inguinal hernia repair and Vasectomy  ,  family history includes Colon cancer in his sister; Diabetes in his father  ,   reports that he has never smoked  He has never used smokeless tobacco  He reports that he drinks alcohol  He reports that he does not use drugs  ,  has No Known Allergies     Current Outpatient Prescriptions   Medication Sig Dispense Refill    fluticasone (FLONASE) 50 mcg/act nasal spray 2 sprays into each nostril daily 16 g 3    ibuprofen (ADVIL) 200 mg tablet Take by mouth      brompheniramine-pseudoephedrine-DM 30-2-10 MG/5ML syrup Take 10 mL every 4 hours as needed for cough and congestion  Max 40 mL/day (Patient not taking: Reported on 11/12/2018 ) 120 mL 0    oseltamivir (TAMIFLU) 75 mg capsule Take 1 capsule (75 mg total) by mouth every 12 (twelve) hours for 5 days 10 capsule 0     No current facility-administered medications for this visit  Review of Systems   Constitutional: Positive for chills, fatigue and fever  HENT: Positive for congestion  Negative for ear pain, mouth sores, postnasal drip, rhinorrhea and sore throat  Eyes: Negative for photophobia, pain, discharge and redness  Respiratory: Positive for cough  Negative for shortness of breath, wheezing and stridor  Cardiovascular: Negative for chest pain  Gastrointestinal: Negative for abdominal pain and nausea  Musculoskeletal: Positive for myalgias  Negative for joint pain and joint swelling  Skin: Negative for rash  Neurological: Positive for headaches  Objective:  Vitals:    11/12/18 1413 11/12/18 1422   BP: 104/72    Pulse: 100    Temp: 98 8 °F (37 1 °C) 100 °F (37 8 °C)   SpO2: 97%    Weight: 92 1 kg (203 lb)    Height: 6' 0 5" (1 842 m)      Body mass index is 27 15 kg/m²  Physical Exam   Constitutional: He appears well-developed and well-nourished  HENT:   Head: Normocephalic     Right Ear: Tympanic membrane, external ear and ear canal normal    Left Ear: Tympanic membrane, external ear and ear canal normal    Nose: Nose normal    Mouth/Throat: Oropharynx is clear and moist  No oropharyngeal exudate  Eyes: Conjunctivae are normal    Cardiovascular: Normal rate, regular rhythm and normal heart sounds  Pulmonary/Chest: Effort normal and breath sounds normal    Lymphadenopathy:     He has no cervical adenopathy

## 2018-11-16 ENCOUNTER — OFFICE VISIT (OUTPATIENT)
Dept: FAMILY MEDICINE CLINIC | Facility: CLINIC | Age: 43
End: 2018-11-16
Payer: COMMERCIAL

## 2018-11-16 ENCOUNTER — TELEPHONE (OUTPATIENT)
Dept: OTHER | Facility: OTHER | Age: 43
End: 2018-11-16

## 2018-11-16 VITALS
OXYGEN SATURATION: 96 % | SYSTOLIC BLOOD PRESSURE: 124 MMHG | HEART RATE: 90 BPM | TEMPERATURE: 97.9 F | DIASTOLIC BLOOD PRESSURE: 80 MMHG | HEIGHT: 73 IN | WEIGHT: 199 LBS | BODY MASS INDEX: 26.37 KG/M2

## 2018-11-16 DIAGNOSIS — J20.9 ACUTE BRONCHITIS, UNSPECIFIED ORGANISM: Primary | ICD-10-CM

## 2018-11-16 PROCEDURE — 1036F TOBACCO NON-USER: CPT | Performed by: PHYSICIAN ASSISTANT

## 2018-11-16 PROCEDURE — 99213 OFFICE O/P EST LOW 20 MIN: CPT | Performed by: PHYSICIAN ASSISTANT

## 2018-11-16 PROCEDURE — 3008F BODY MASS INDEX DOCD: CPT | Performed by: PHYSICIAN ASSISTANT

## 2018-11-16 RX ORDER — AZITHROMYCIN 250 MG/1
TABLET, FILM COATED ORAL
Qty: 6 TABLET | Refills: 0 | Status: SHIPPED | OUTPATIENT
Start: 2018-11-16 | End: 2018-11-20

## 2018-11-16 RX ORDER — BENZONATATE 100 MG/1
100 CAPSULE ORAL 3 TIMES DAILY PRN
Qty: 30 CAPSULE | Refills: 0 | Status: SHIPPED | OUTPATIENT
Start: 2018-11-16 | End: 2019-01-30 | Stop reason: ALTCHOICE

## 2018-11-16 NOTE — PROGRESS NOTES
Assessment/Plan:       Diagnoses and all orders for this visit:    Acute bronchitis, unspecified organism  -     azithromycin (ZITHROMAX) 250 mg tablet; 2 tablets day 1 and then 1 tablet day 2-5  -     benzonatate (TESSALON PERLES) 100 mg capsule; Take 1 capsule (100 mg total) by mouth 3 (three) times a day as needed for cough            Subjective:      Patient ID: Dori Miller is a 39 y o  male  Patient is here for follow-up  He was diagnosed by me with probable flu on Monday  Since that time he has been taking Tamiflu and feels much better  What he notes now is a bad cough  He is now coughing up a purulent sputum  He is having chest wall pain due to his coughing  He has been taking Mucinex which has been helping him cough the mucus out  Cough   This is a new problem  The current episode started in the past 7 days  The problem has been gradually worsening  The problem occurs every few minutes  The cough is productive of sputum and productive of purulent sputum  Associated symptoms include chest pain and a sore throat  Pertinent negatives include no chills, fever, hemoptysis, nasal congestion, postnasal drip, shortness of breath, sweats or wheezing  The symptoms are aggravated by lying down  He has tried OTC cough suppressant and rest for the symptoms  The treatment provided mild relief  The following portions of the patient's history were reviewed and updated as appropriate:   He has a past medical history of Temporomandibular joint tender  ,   does not have any pertinent problems on file  ,   has a past surgical history that includes Inguinal hernia repair and Vasectomy  ,  family history includes Colon cancer in his sister; Diabetes in his father  ,   reports that he has never smoked  He has never used smokeless tobacco  He reports that he drinks alcohol  He reports that he does not use drugs  ,  has No Known Allergies     Current Outpatient Prescriptions   Medication Sig Dispense Refill    brompheniramine-pseudoephedrine-DM 30-2-10 MG/5ML syrup Take 10 mL every 4 hours as needed for cough and congestion  Max 40 mL/day 120 mL 0    fluticasone (FLONASE) 50 mcg/act nasal spray 2 sprays into each nostril daily 16 g 3    ibuprofen (ADVIL) 200 mg tablet Take by mouth      oseltamivir (TAMIFLU) 75 mg capsule Take 1 capsule (75 mg total) by mouth every 12 (twelve) hours for 5 days 10 capsule 0    azithromycin (ZITHROMAX) 250 mg tablet 2 tablets day 1 and then 1 tablet day 2-5 6 tablet 0    benzonatate (TESSALON PERLES) 100 mg capsule Take 1 capsule (100 mg total) by mouth 3 (three) times a day as needed for cough 30 capsule 0     No current facility-administered medications for this visit  Review of Systems   Constitutional: Positive for fatigue  Negative for chills and fever  HENT: Positive for sore throat  Negative for congestion and postnasal drip  Respiratory: Positive for cough  Negative for hemoptysis, chest tightness, shortness of breath and wheezing  Cardiovascular: Positive for chest pain  Objective:  Vitals:    11/16/18 1544   BP: 124/80   Pulse: 90   Temp: 97 9 °F (36 6 °C)   SpO2: 96%   Weight: 90 3 kg (199 lb)   Height: 6' 0 5" (1 842 m)     Body mass index is 26 62 kg/m²  Physical Exam   Constitutional: He is oriented to person, place, and time  He appears well-developed and well-nourished  Cardiovascular: Normal rate, regular rhythm and normal heart sounds  Pulmonary/Chest: Effort normal and breath sounds normal    Neurological: He is alert and oriented to person, place, and time  Skin: Skin is dry  Psychiatric: He has a normal mood and affect  His behavior is normal    Nursing note and vitals reviewed

## 2019-01-30 ENCOUNTER — OFFICE VISIT (OUTPATIENT)
Dept: FAMILY MEDICINE CLINIC | Facility: CLINIC | Age: 44
End: 2019-01-30
Payer: COMMERCIAL

## 2019-01-30 VITALS
OXYGEN SATURATION: 97 % | RESPIRATION RATE: 17 BRPM | SYSTOLIC BLOOD PRESSURE: 130 MMHG | BODY MASS INDEX: 27.17 KG/M2 | DIASTOLIC BLOOD PRESSURE: 76 MMHG | TEMPERATURE: 99.6 F | HEART RATE: 88 BPM | WEIGHT: 205 LBS | HEIGHT: 73 IN

## 2019-01-30 DIAGNOSIS — J01.90 ACUTE NON-RECURRENT SINUSITIS, UNSPECIFIED LOCATION: Primary | ICD-10-CM

## 2019-01-30 PROBLEM — J20.9 ACUTE BRONCHITIS: Status: RESOLVED | Noted: 2018-11-16 | Resolved: 2019-01-30

## 2019-01-30 PROBLEM — B34.9 VIRAL INFECTION: Status: RESOLVED | Noted: 2018-11-12 | Resolved: 2019-01-30

## 2019-01-30 PROBLEM — J06.9 VIRAL URI WITH COUGH: Status: RESOLVED | Noted: 2018-09-24 | Resolved: 2019-01-30

## 2019-01-30 LAB — S PYO AG THROAT QL: NEGATIVE

## 2019-01-30 PROCEDURE — 99213 OFFICE O/P EST LOW 20 MIN: CPT | Performed by: NURSE PRACTITIONER

## 2019-01-30 PROCEDURE — 87880 STREP A ASSAY W/OPTIC: CPT | Performed by: NURSE PRACTITIONER

## 2019-01-30 PROCEDURE — 3008F BODY MASS INDEX DOCD: CPT | Performed by: NURSE PRACTITIONER

## 2019-01-30 PROCEDURE — 1036F TOBACCO NON-USER: CPT | Performed by: NURSE PRACTITIONER

## 2019-01-30 RX ORDER — AMOXICILLIN AND CLAVULANATE POTASSIUM 875; 125 MG/1; MG/1
1 TABLET, FILM COATED ORAL EVERY 12 HOURS SCHEDULED
Qty: 20 TABLET | Refills: 0 | Status: SHIPPED | OUTPATIENT
Start: 2019-01-30 | End: 2019-02-09

## 2019-01-30 NOTE — PROGRESS NOTES
Assessment/Plan:    Acute non-recurrent sinusitis  To begin Augmentin 1 tablet every 12 hours for 10 days  May continue the [facility] and Sudafed for additional symptomatic relief  Counseled on increasing fluid intake, beginning sinus rinses, and using a cool mist humidifier nighttime  Follow-up if symptoms not begin to improve in 1 week or sooner if symptoms worsen  Diagnoses and all orders for this visit:    Acute non-recurrent sinusitis, unspecified location  -     amoxicillin-clavulanate (AUGMENTIN) 875-125 mg per tablet; Take 1 tablet by mouth every 12 (twelve) hours for 10 days  -     POCT rapid strepA          Subjective:      Patient ID: Troy De is a 39 y o  male  [facility] nasal congestion for the past week  He is also having a dry cough, sore throat, and swollen glands in his neck  He has been treating his symptoms with Flonase, Advil, and Sudafed with minimal improvement  His last dose of Advil was around 8 this morning  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  Denies fever, chills, shortness of breath, or wheezing  The following portions of the patient's history were reviewed and updated as appropriate: He   Patient Active Problem List    Diagnosis Date Noted    Acute non-recurrent sinusitis 01/30/2019    Hyperlipidemia, mixed 09/11/2017     Current Outpatient Prescriptions   Medication Sig Dispense Refill    amoxicillin-clavulanate (AUGMENTIN) 875-125 mg per tablet Take 1 tablet by mouth every 12 (twelve) hours for 10 days 20 tablet 0    fluticasone (FLONASE) 50 mcg/act nasal spray 2 sprays into each nostril daily 16 g 3    ibuprofen (ADVIL) 200 mg tablet Take by mouth       No current facility-administered medications for this visit  He has No Known Allergies       Review of Systems   Constitutional: Negative  HENT: Positive for congestion, postnasal drip, rhinorrhea and sore throat  Respiratory: Positive for cough  Cardiovascular: Negative  Gastrointestinal: Negative  Neurological: Negative  Hematological: Positive for adenopathy  Psychiatric/Behavioral: Negative  /76   Pulse 88   Temp 99 6 °F (37 6 °C)   Resp 17   Ht 6' 0 5" (1 842 m)   Wt 93 kg (205 lb)   SpO2 97%   BMI 27 42 kg/m²     Objective:     Physical Exam   Constitutional: He is oriented to person, place, and time  He appears well-developed and well-nourished  HENT:   Head: Normocephalic and atraumatic  Left Ear: Tympanic membrane normal    Nose: Mucosal edema and rhinorrhea present  Mouth/Throat: Mucous membranes are normal  Oropharyngeal exudate and posterior oropharyngeal erythema present  Eyes: Conjunctivae are normal    Neck: Neck supple  Cardiovascular: Normal rate, regular rhythm and normal heart sounds  No murmur heard  Pulmonary/Chest: Effort normal and breath sounds normal  No respiratory distress  He has no wheezes  He has no rales  He exhibits no tenderness  Lymphadenopathy:     He has cervical adenopathy  Right cervical: Superficial cervical adenopathy present  Left cervical: Superficial cervical adenopathy present  Neurological: He is alert and oriented to person, place, and time  Psychiatric: He has a normal mood and affect  His behavior is normal  Judgment and thought content normal    Nursing note and vitals reviewed        Results for orders placed or performed in visit on 01/30/19   POCT rapid strepA   Result Value Ref Range     RAPID STREP A Negative Negative

## 2019-01-30 NOTE — ASSESSMENT & PLAN NOTE
To begin Augmentin 1 tablet every 12 hours for 10 days  May continue the [facility] and [facility] for additional symptomatic relief  Counseled on increasing fluid intake, beginning sinus rinses, and using a cool mist humidifier nighttime  Follow-up if symptoms not begin to improve in 1 week or sooner if symptoms worsen

## 2019-05-14 DIAGNOSIS — J30.1 SEASONAL ALLERGIC RHINITIS DUE TO POLLEN: ICD-10-CM

## 2019-05-16 RX ORDER — FLUTICASONE PROPIONATE 50 MCG
SPRAY, SUSPENSION (ML) NASAL
Qty: 1 BOTTLE | Refills: 3 | Status: SHIPPED | OUTPATIENT
Start: 2019-05-16 | End: 2019-11-29 | Stop reason: SDUPTHER

## 2019-05-23 ENCOUNTER — TELEPHONE (OUTPATIENT)
Dept: FAMILY MEDICINE CLINIC | Facility: CLINIC | Age: 44
End: 2019-05-23

## 2019-05-23 DIAGNOSIS — G47.30 SLEEP DISORDER BREATHING: Primary | ICD-10-CM

## 2019-07-31 ENCOUNTER — TELEPHONE (OUTPATIENT)
Dept: PULMONOLOGY | Facility: CLINIC | Age: 44
End: 2019-07-31

## 2019-09-26 ENCOUNTER — CONSULT (OUTPATIENT)
Dept: PULMONOLOGY | Facility: CLINIC | Age: 44
End: 2019-09-26
Payer: COMMERCIAL

## 2019-09-26 VITALS
DIASTOLIC BLOOD PRESSURE: 70 MMHG | BODY MASS INDEX: 27.17 KG/M2 | HEIGHT: 73 IN | WEIGHT: 205 LBS | SYSTOLIC BLOOD PRESSURE: 116 MMHG | OXYGEN SATURATION: 99 % | HEART RATE: 90 BPM

## 2019-09-26 DIAGNOSIS — R06.81 WITNESSED EPISODE OF APNEA: Primary | ICD-10-CM

## 2019-09-26 DIAGNOSIS — Z91.89 RISK FACTORS FOR OBSTRUCTIVE SLEEP APNEA: ICD-10-CM

## 2019-09-26 DIAGNOSIS — R06.83 SNORING: ICD-10-CM

## 2019-09-26 PROCEDURE — 99244 OFF/OP CNSLTJ NEW/EST MOD 40: CPT | Performed by: PHYSICIAN ASSISTANT

## 2019-09-26 NOTE — PROGRESS NOTES
Assessment:    1  Witnessed episode of apnea  Diagnostic Sleep Study   2  Snoring  Diagnostic Sleep Study   3  Risk factors for obstructive sleep apnea         Plan:   Obstructive Sleep Apnea Etiology pathogenesis discussed with the patient in detail  Patient has signs and symptoms of obstructive sleep apnea including snoring, witnessed apneas, nocturnal choking, excessive daytime sleepiness  He will undergo an all night polysomnogram and if there is evidence of abnormal nocturnal breathing he will undergo a CPAP titration study for maximal benefit  Consequences of untreated sleep apnea including excessive daytime sleepiness and increased risk for myocardial infarction stroke atrial fibrillation discussed with the patient  Testing procedure was discussed in detail  Cautioned against driving when sleepy  Follow-up after the above testing  All of the patient's questions were answered to their satisfaction and understanding, which was verbalized  Patient was advised to call the office prior to next appointment should they have any questions or concerns prior  Patient made aware of worrisome symptoms that should prompt a visit to the ER or call to 911 such as chest pain, severe shortness of breath, cyanosis, throat closing, syncope, etc     Subjective:     Patient ID: Nancy Casey is a 38 y o  male  Chief Complaint:  Travis De Souza is a very pleasant 48-year-old male nonsmoker with past medical history including seasonal allergies and hyperlipidemia  He presents today for sleep apnea evaluation  Patient has signs and symptoms consistent with sleep apnea including loud snoring, witnessed apneic periods and nocturnal choking  He is excessively tired throughout the day and takes frequent naps despite getting 7 hours of sleep a night  He goes to bed around 10 30 p m  and wakes up around 5:30 a m  every day  He works as a schoolteacher  Family history significant for 2 brothers with sleep apnea      The following portions of the patient's history were reviewed in this encounter and updated as appropriate: Past medical, social, surgical, family, allergies    Review of Systems   Constitutional: Positive for fatigue  Negative for activity change  HENT: Positive for congestion  Respiratory: Negative for cough, shortness of breath and wheezing  Cardiovascular: Negative for chest pain  Neurological: Negative for dizziness, syncope and light-headedness  Psychiatric/Behavioral: Positive for sleep disturbance  All other systems reviewed and are negative  Objective:    Physical Exam   Constitutional: He is oriented to person, place, and time  He appears well-developed and well-nourished  No distress  Overweight   HENT:   Head: Normocephalic and atraumatic  Right Ear: External ear normal    Left Ear: External ear normal    Nose: Nose normal    Mouth/Throat: Oropharynx is clear and moist  No oropharyngeal exudate  Eyes: Conjunctivae and EOM are normal  Right eye exhibits no discharge  Left eye exhibits no discharge  No scleral icterus  Neck: Normal range of motion  Neck supple  No tracheal deviation present  Short and wide neck   Cardiovascular: Normal rate, regular rhythm and normal heart sounds  Exam reveals no gallop and no friction rub  No murmur heard  Pulmonary/Chest: Effort normal and breath sounds normal  No stridor  No respiratory distress  He has no wheezes  Abdominal: He exhibits no distension  There is no guarding  Musculoskeletal: Normal range of motion  He exhibits no edema, tenderness or deformity  Neurological: He is alert and oriented to person, place, and time  No cranial nerve deficit  He exhibits normal muscle tone  Skin: Skin is warm and dry  No rash noted  He is not diaphoretic  No erythema  No pallor  Psychiatric: He has a normal mood and affect  His behavior is normal  Judgment and thought content normal    Nursing note and vitals reviewed        Lab Review: No visits with results within 2 Month(s) from this visit     Latest known visit with results is:   Office Visit on 01/30/2019   Component Date Value     RAPID STREP A 01/30/2019 Negative

## 2019-10-08 ENCOUNTER — TELEPHONE (OUTPATIENT)
Dept: SLEEP CENTER | Facility: CLINIC | Age: 44
End: 2019-10-08

## 2019-10-08 NOTE — TELEPHONE ENCOUNTER
----- Message from Ceci Huerta DO sent at 10/8/2019  2:51 PM EDT -----  Chart reviewed  Study approved  Referring provider to provide patient f/u  ----- Message -----  From: Colin Malin MA  Sent: 10/2/2019   4:19 PM EDT  To: Sleep Medicine [facility] Provider    This sleep study needs approval      If approved please sign and return to clerical pool  If denied please include reasons why  Also provide alternative testing if warranted  Please sign and return to clerical pool

## 2019-11-25 ENCOUNTER — TELEPHONE (OUTPATIENT)
Dept: OTHER | Facility: OTHER | Age: 44
End: 2019-11-25

## 2019-11-26 ENCOUNTER — CLINICAL SUPPORT (OUTPATIENT)
Dept: FAMILY MEDICINE CLINIC | Facility: CLINIC | Age: 44
End: 2019-11-26
Payer: COMMERCIAL

## 2019-11-26 DIAGNOSIS — R30.0 DYSURIA: Primary | ICD-10-CM

## 2019-11-26 DIAGNOSIS — N34.2 INFECTIVE URETHRITIS: Primary | ICD-10-CM

## 2019-11-26 LAB
SL AMB  POCT GLUCOSE, UA: NEGATIVE
SL AMB LEUKOCYTE ESTERASE,UA: NEGATIVE
SL AMB POCT BILIRUBIN,UA: NEGATIVE
SL AMB POCT BLOOD,UA: NEGATIVE
SL AMB POCT CLARITY,UA: ABNORMAL
SL AMB POCT COLOR,UA: YELLOW
SL AMB POCT KETONES,UA: NEGATIVE
SL AMB POCT NITRITE,UA: NEGATIVE
SL AMB POCT PH,UA: 5
SL AMB POCT SPECIFIC GRAVITY,UA: 1.02
SL AMB POCT URINE PROTEIN: ABNORMAL
SL AMB POCT UROBILINOGEN: 0.2

## 2019-11-26 PROCEDURE — 87086 URINE CULTURE/COLONY COUNT: CPT | Performed by: FAMILY MEDICINE

## 2019-11-26 PROCEDURE — 81002 URINALYSIS NONAUTO W/O SCOPE: CPT | Performed by: FAMILY MEDICINE

## 2019-11-26 RX ORDER — SULFAMETHOXAZOLE AND TRIMETHOPRIM 800; 160 MG/1; MG/1
1 TABLET ORAL 2 TIMES DAILY
Qty: 20 TABLET | Refills: 0 | Status: SHIPPED | OUTPATIENT
Start: 2019-11-26 | End: 2019-12-06

## 2019-11-27 LAB — BACTERIA UR CULT: NORMAL

## 2019-11-29 DIAGNOSIS — J30.1 SEASONAL ALLERGIC RHINITIS DUE TO POLLEN: ICD-10-CM

## 2019-11-29 RX ORDER — FLUTICASONE PROPIONATE 50 MCG
SPRAY, SUSPENSION (ML) NASAL
Qty: 16 ML | Refills: 3 | Status: SHIPPED | OUTPATIENT
Start: 2019-11-29 | End: 2020-12-07 | Stop reason: SDUPTHER

## 2020-01-17 ENCOUNTER — HOSPITAL ENCOUNTER (OUTPATIENT)
Dept: SLEEP CENTER | Facility: CLINIC | Age: 45
Discharge: HOME | End: 2020-01-17
Payer: COMMERCIAL

## 2020-01-17 DIAGNOSIS — R06.83 SNORING: ICD-10-CM

## 2020-01-17 DIAGNOSIS — R06.81 WITNESSED EPISODE OF APNEA: ICD-10-CM

## 2020-01-17 PROCEDURE — 95810 POLYSOM 6/> YRS 4/> PARAM: CPT

## 2020-01-18 NOTE — PROGRESS NOTES
Sleep Study Documentation    Pre-Sleep Study       Sleep testing procedure explained to patient:YES    Patient napped prior to study:NO    Caffeine:Dayshift worker after 12PM   Caffeine use:YES- tea  6 to 18 ounces    Alcohol:Dayshift workers after 5PM: Alcohol use:NO    Typical day for patient:YES       Study Documentation    Sleep Study Indications: snoring, EDS, witnessed gasping, witnessed apnea, unrefreshing sleep, nocturnal choking    Sleep Study: Diagnostic   Snore:Severe  Supplemental O2: no    O2 flow rate (L/min) range n/a  O2 flow rate (L/min) final n/a  Minimum SaO2 84%  Baseline SaO2 94%        Mode of Therapy:n/a    EKG abnormalities: no     EEG abnormalities: no    Sleep Study Recorded < 2 hours: N/A    Sleep Study Recorded > 2 hours but incomplete study: N/A    Sleep Study Recorded 6 hours but no sleep obtained: NO    Patient classification: employed       Post-Sleep Study    Medication used at bedtime or during sleep study:NO    Patient reports time it took to fall asleep:20 to 30 minutes    Patient reports waking up during study:3 or more times  Patient reports returning to sleep in 10 to 30 minutes  Patient reports sleeping 4 to 6 hours with dreaming  Patient reports sleep during study:worse than usual    Patient rated sleepiness: Somewhat sleepy or tired    PAP treatment:no

## 2020-01-20 ENCOUNTER — TELEPHONE (OUTPATIENT)
Dept: PULMONOLOGY | Facility: CLINIC | Age: 45
End: 2020-01-20

## 2020-01-20 NOTE — TELEPHONE ENCOUNTER
----- Message from Dwayne Matthews PA-C sent at 1/20/2020  1:47 PM EST -----  Please let the patient know sleep study did show severe sleep apnea, would like him to follow-up soon to discuss results

## 2020-01-31 ENCOUNTER — OFFICE VISIT (OUTPATIENT)
Dept: PULMONOLOGY | Facility: CLINIC | Age: 45
End: 2020-01-31
Payer: COMMERCIAL

## 2020-01-31 VITALS
DIASTOLIC BLOOD PRESSURE: 70 MMHG | HEIGHT: 73 IN | HEART RATE: 82 BPM | OXYGEN SATURATION: 93 % | WEIGHT: 230 LBS | SYSTOLIC BLOOD PRESSURE: 110 MMHG | BODY MASS INDEX: 30.48 KG/M2

## 2020-01-31 DIAGNOSIS — G47.33 OSA (OBSTRUCTIVE SLEEP APNEA): Primary | ICD-10-CM

## 2020-01-31 DIAGNOSIS — E66.9 OBESITY (BMI 30-39.9): ICD-10-CM

## 2020-01-31 DIAGNOSIS — G47.61 PERIODIC LIMB MOVEMENT DISORDER: ICD-10-CM

## 2020-01-31 DIAGNOSIS — J30.89 SEASONAL ALLERGIC RHINITIS DUE TO OTHER ALLERGIC TRIGGER: ICD-10-CM

## 2020-01-31 PROCEDURE — 99214 OFFICE O/P EST MOD 30 MIN: CPT | Performed by: PHYSICIAN ASSISTANT

## 2020-01-31 NOTE — PROGRESS NOTES
Assessment:    1  MAU (obstructive sleep apnea)  CPAP Auto New DME    CPAP Study   2  Periodic limb movement disorder     3  Obesity (BMI 30-39 9)     4  Seasonal allergic rhinitis due to other allergic trigger           Plan:   Patient presents today for follow-up after going for diagnostic sleep study  Discussed with him in detail the results, suggestive of severe MAU with AHI of 76 1 in the supine position  There was also evidence of mild periodic limb movement disorder  Recommendation is for CPAP titration study  Will initiate therapy with auto CPAP and plan for titration study and adjust pressures as appropriate  Patient is requesting a portable CPAP  Discussed with him importance of frequent cleaning and regular changing of the supplies  Educated about consequences of untreated sleep apnea including increased risk for cardiac disease and CVA  Recommend weight loss  Using Flonase PRN    All of the patient's questions were answered to their satisfaction and understanding, which was verbalized  Patient was advised to call the office prior to next appointment should they have any questions or concerns prior  Patient made aware of worrisome symptoms that should prompt a visit to the ER or call to 911 such as chest pain, severe shortness of breath, cyanosis, throat closing, syncope, etc     Subjective:     Patient ID: Tika Stewart is a 38 y o  male  Chief Complaint:  Debra German is a pleasant 44-year-old male nonsmoker with past medical history including newly diagnosed MAU, periodic limb movement disorder, obesity, seasonal allergies, allergic rhinitis, recurrent sinusitis, hyperlipidemia  He presents today for follow-up  Patient has gone for diagnostic sleep study demonstrating severe sleep apnea  There are signs and symptoms consistent with sleep apnea including loud snoring, witnessed apneic periods and nocturnal choking    He is excessively tired throughout the day and takes frequent naps despite getting at least 7 hours of sleep every night  He does not have difficulty staying awake while driving  He has a family history including sleep apnea  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, 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, camping outdoors on a regular basis  The following portions of the patient's history were reviewed in this encounter and updated as appropriate: Past medical, social, surgical, family, allergies    Review of Systems   Constitutional: Positive for fatigue  HENT: Positive for congestion  Respiratory: Negative for shortness of breath  Cardiovascular: Negative for chest pain  Psychiatric/Behavioral: Positive for sleep disturbance  All other systems reviewed and are negative  Objective:    Physical Exam   Constitutional: He is oriented to person, place, and time  He appears well-developed and well-nourished  No distress  Obese   HENT:   Head: Normocephalic and atraumatic  Right Ear: External ear normal    Left Ear: External ear normal    Nose: Nose normal    Mouth/Throat: Oropharynx is clear and moist  No oropharyngeal exudate  Crowded airway   Eyes: Conjunctivae and EOM are normal  Right eye exhibits no discharge  Left eye exhibits no discharge  No scleral icterus  Neck: Normal range of motion  Neck supple  No tracheal deviation present  Short and wide neck   Cardiovascular: Normal rate, regular rhythm and normal heart sounds  Exam reveals no gallop and no friction rub  No murmur heard  Pulmonary/Chest: Effort normal and breath sounds normal  No stridor  No respiratory distress  He has no wheezes  Abdominal: There is no guarding  Musculoskeletal: Normal range of motion  He exhibits no edema, tenderness or deformity  Neurological: He is alert and oriented to person, place, and time  No cranial nerve deficit  He exhibits normal muscle tone  Skin: Skin is warm and dry  No rash noted  He is not diaphoretic  No erythema  No pallor  Psychiatric: He has a normal mood and affect  His behavior is normal  Judgment and thought content normal    Nursing note and vitals reviewed  Lab Review:   No visits with results within 2 Month(s) from this visit     Latest known visit with results is:   Clinical Support on 11/26/2019   Component Date Value    LEUKOCYTE ESTERASE,UA 11/26/2019 negative     NITRITE,UA 11/26/2019 negative     SL AMB POCT UROBILINOGEN 11/26/2019 0 2     POCT URINE PROTEIN 11/26/2019 postive +      PH,UA 11/26/2019 5 0     BLOOD,UA 11/26/2019 negative     SPECIFIC GRAVITY,UA 11/26/2019 1 020     KETONES,UA 11/26/2019 negative     BILIRUBIN,UA 11/26/2019 negative     GLUCOSE, UA 11/26/2019 negative      COLOR,UA 11/26/2019 yellow     CLARITY,UA 11/26/2019 clowdy     Urine Culture 11/26/2019 No Growth <1000 cfu/mL

## 2020-02-05 ENCOUNTER — OFFICE VISIT (OUTPATIENT)
Dept: FAMILY MEDICINE CLINIC | Facility: CLINIC | Age: 45
End: 2020-02-05
Payer: COMMERCIAL

## 2020-02-05 ENCOUNTER — TELEPHONE (OUTPATIENT)
Dept: OTHER | Facility: OTHER | Age: 45
End: 2020-02-05

## 2020-02-05 VITALS
HEIGHT: 72 IN | HEART RATE: 82 BPM | BODY MASS INDEX: 27.36 KG/M2 | SYSTOLIC BLOOD PRESSURE: 108 MMHG | DIASTOLIC BLOOD PRESSURE: 70 MMHG | TEMPERATURE: 97.2 F | RESPIRATION RATE: 18 BRPM | WEIGHT: 202 LBS | OXYGEN SATURATION: 97 %

## 2020-02-05 DIAGNOSIS — J06.9 VIRAL UPPER RESPIRATORY TRACT INFECTION: Primary | ICD-10-CM

## 2020-02-05 PROCEDURE — 99213 OFFICE O/P EST LOW 20 MIN: CPT | Performed by: FAMILY MEDICINE

## 2020-02-05 PROCEDURE — 3008F BODY MASS INDEX DOCD: CPT | Performed by: FAMILY MEDICINE

## 2020-02-05 PROCEDURE — 3008F BODY MASS INDEX DOCD: CPT | Performed by: PHYSICIAN ASSISTANT

## 2020-02-05 PROCEDURE — 1036F TOBACCO NON-USER: CPT | Performed by: FAMILY MEDICINE

## 2020-02-05 RX ORDER — AMOXICILLIN 500 MG/1
500 CAPSULE ORAL EVERY 8 HOURS SCHEDULED
Qty: 30 CAPSULE | Refills: 0 | Status: SHIPPED | OUTPATIENT
Start: 2020-02-05 | End: 2020-02-15

## 2020-02-05 NOTE — PROGRESS NOTES
BMI Counseling: Body mass index is 27 4 kg/m²  The BMI is above normal  Nutrition recommendations include decreasing portion sizes, decreasing fast food intake, consuming healthier snacks, moderation in carbohydrate intake and reducing intake of saturated and trans fat  Exercise recommendations include moderate physical activity 150 minutes/week  No pharmacotherapy was ordered  Assessment/Plan:     Chronic Problems:  No problem-specific Assessment & Plan notes found for this encounter  Visit Diagnosis:  Diagnoses and all orders for this visit:    Viral upper respiratory tract infection  -     amoxicillin (AMOXIL) 500 mg capsule; Take 1 capsule (500 mg total) by mouth every 8 (eight) hours for 10 days    Increase oral hydration, warm tea with honey, increase nutrition   Adequate rest  Tylenol or Motrin for pain and/or fever  Nasal mist  Humidify room  Use decongestant- Mucinex  Zinc lozenges   Good handwashing      Subjective:    Patient ID: Solo Christiansen is a 38 y o  male  For the last 48 hr   St , cough   Cough slightly persistent , pnd , clear   Diarrhea just today   -fever , chills , urinary - , abd pain -  +ill contact,  Teacher   kory   ot med -   rx -           The following portions of the patient's history were reviewed and updated as appropriate: allergies, current medications, past family history, past medical history, past social history, past surgical history and problem list     Review of Systems   Constitutional: Negative for appetite change, chills, fever and unexpected weight change  HENT: Negative for congestion, dental problem, ear pain, hearing loss, postnasal drip, rhinorrhea, sinus pressure, sinus pain, sneezing, sore throat, tinnitus and voice change  Eyes: Negative for visual disturbance  Respiratory: Negative for apnea, cough, chest tightness and shortness of breath  Cardiovascular: Negative for chest pain, palpitations and leg swelling  Gastrointestinal: Negative for abdominal pain, blood in stool, constipation, diarrhea, nausea and vomiting  Endocrine: Negative for cold intolerance, heat intolerance, polydipsia, polyphagia and polyuria  Genitourinary: Negative for decreased urine volume, difficulty urinating, dysuria, frequency and hematuria  Musculoskeletal: Negative for arthralgias, back pain, gait problem, joint swelling and myalgias  Skin: Negative for color change, rash and wound  Allergic/Immunologic: Negative for environmental allergies and food allergies  Neurological: Negative for dizziness, syncope, weakness, light-headedness, numbness and headaches  Hematological: Negative for adenopathy  Does not bruise/bleed easily  Psychiatric/Behavioral: Negative for sleep disturbance and suicidal ideas  The patient is not nervous/anxious            /70   Pulse 82   Temp (!) 97 2 °F (36 2 °C) (Tympanic)   Resp 18   Ht 6' (1 829 m)   Wt 91 6 kg (202 lb)   SpO2 97%   BMI 27 40 kg/m²   Social History     Socioeconomic History    Marital status: /Civil Union     Spouse name: Not on file    Number of children: Not on file    Years of education: Not on file    Highest education level: Not on file   Occupational History    Not on file   Social Needs    Financial resource strain: Not on file    Food insecurity:     Worry: Not on file     Inability: Not on file    Transportation needs:     Medical: Not on file     Non-medical: Not on file   Tobacco Use    Smoking status: Never Smoker    Smokeless tobacco: Never Used   Substance and Sexual Activity    Alcohol use: Yes     Comment: occasionally    Drug use: No    Sexual activity: Not on file   Lifestyle    Physical activity:     Days per week: Not on file     Minutes per session: Not on file    Stress: Not on file   Relationships    Social connections:     Talks on phone: Not on file     Gets together: Not on file     Attends [employer] service: Not on file Active member of club or organization: Not on file     Attends meetings of clubs or organizations: Not on file     Relationship status: Not on file    Intimate partner violence:     Fear of current or ex partner: Not on file     Emotionally abused: Not on file     Physically abused: Not on file     Forced sexual activity: Not on file   Other Topics Concern    Not on file   Social History Narrative    Not on file     Past Medical History:   Diagnosis Date    MAU (obstructive sleep apnea)     Temporomandibular joint tender     [id number] resolved     Family History   Problem Relation Age of Onset    Diabetes Father     Colon cancer Sister     Sleep apnea Brother     Sleep apnea Brother      Past Surgical History:   Procedure Laterality Date    INGUINAL HERNIA REPAIR      VASECTOMY         Current Outpatient Medications:     fluticasone (FLONASE) 50 mcg/act nasal spray, SPRAY 2 SPRAYS INTO EACH NOSTRIL EVERY DAY, Disp: 16 mL, Rfl: 3    ibuprofen (ADVIL) 200 mg tablet, Take by mouth, Disp: , Rfl:     amoxicillin (AMOXIL) 500 mg capsule, Take 1 capsule (500 mg total) by mouth every 8 (eight) hours for 10 days, Disp: 30 capsule, Rfl: 0    No Known Allergies       Lab Review   Clinical Support on 11/26/2019   Component Date Value    LEUKOCYTE ESTERASE,UA 11/26/2019 negative     NITRITE,UA 11/26/2019 negative     SL AMB POCT UROBILINOGEN 11/26/2019 0 2     POCT URINE PROTEIN 11/26/2019 postive +      PH,UA 11/26/2019 5 0     BLOOD,UA 11/26/2019 negative     SPECIFIC GRAVITY,UA 11/26/2019 1 020     KETONES,UA 11/26/2019 negative     BILIRUBIN,UA 11/26/2019 negative     GLUCOSE, UA 11/26/2019 negative      COLOR,UA 11/26/2019 yellow     CLARITY,UA 11/26/2019 clowdy     Urine Culture 11/26/2019 No Growth <1000 cfu/mL         Imaging: No results found  Objective:     Physical Exam   Constitutional: He is oriented to person, place, and time  He appears well-developed and well-nourished   He does not appear ill  HENT:   Head: Normocephalic and atraumatic  Right Ear: Hearing, tympanic membrane and ear canal normal    Left Ear: Hearing, tympanic membrane and ear canal normal    Mouth/Throat: No posterior oropharyngeal edema  Eyes: EOM are normal    Neck: Normal range of motion  Neck supple  Cardiovascular: Normal rate, regular rhythm and normal heart sounds  Pulmonary/Chest: Effort normal and breath sounds normal    Abdominal: Soft  Bowel sounds are normal    Musculoskeletal: Normal range of motion  Neurological: He is alert and oriented to person, place, and time  He has normal reflexes  Skin: Skin is warm and dry  Psychiatric: He has a normal mood and affect  His behavior is normal  Judgment and thought content normal          There are no Patient Instructions on file for this visit  ELSI Alarcon    Portions of the record may have been created with voice recognition software  Occasional wrong word or "sound a like" substitutions may have occurred due to the inherent limitations of voice recognition software  Read the chart carefully and recognize, using context, where substitutions have occurred

## 2020-02-05 NOTE — TELEPHONE ENCOUNTER
Pt thinks he may have an URI- he wanted a message sent to the office to contact him for an apt today  Please call pt for a Sick visit

## 2020-02-08 ENCOUNTER — HOSPITAL ENCOUNTER (OUTPATIENT)
Dept: SLEEP CENTER | Facility: CLINIC | Age: 45
Discharge: HOME | End: 2020-02-08
Payer: COMMERCIAL

## 2020-02-08 DIAGNOSIS — G47.33 OSA (OBSTRUCTIVE SLEEP APNEA): ICD-10-CM

## 2020-02-08 PROCEDURE — 95811 POLYSOM 6/>YRS CPAP 4/> PARM: CPT

## 2020-02-08 PROCEDURE — 95811 POLYSOM 6/>YRS CPAP 4/> PARM: CPT | Performed by: INTERNAL MEDICINE

## 2020-02-09 NOTE — PROGRESS NOTES
Sleep Study Documentation    Pre-Sleep Study       Sleep testing procedure explained to patient:YES    Patient napped prior to study:YES- less than 30 minutes  Napped after 2PM: yes    Caffeine:Dayshift worker after 12PM   Caffeine use:YES- ice tea  12 ounces    Alcohol:Dayshift workers after 5PM: Alcohol use:NO    Typical day for patient:NO       Study Documentation    Sleep Study Indications: MAU in-lab    Sleep Study: Treatment   Optimal PAP pressure: 8 cm  Leak:Small  Snore:Eliminated  REM Obtained:yes  Supplemental O2: no    Minimum SaO2 92%  Baseline SaO2 94%  PAP mask tried (list all) [employer] Dreamwear nasal pillows small, [employer] Air[employer] N20 nasal mask medium, [employer] Simplus full face mask medium  PAP mask choice (final) [employer] Simplus full face mask medium  PAP mask type:full face  PAP pressure at which snoring was eliminated 6 cm  Minimum SaO2 at final PAP pressure 94%  Mode of Therapy:CPAP  ETCO2:No  CPAP changed to BiPAP:No    Mode of Therapy:CPAP    EKG abnormalities: no     EEG abnormalities: no    Sleep Study Recorded < 2 hours: N/A    Sleep Study Recorded > 2 hours but incomplete study: N/A    Sleep Study Recorded 6 hours but no sleep obtained: NO    Patient classification: employed       Post-Sleep Study    Medication used at bedtime or during sleep study:YES other prescription medications    Patient reports time it took to fall asleep:less than 20 minutes    Patient reports waking up during study:3 or more times  Patient reports returning to sleep in 10 to 30 minutes  Patient reports sleeping 4 to 6 hours with dreaming  Patient reports sleep during study:worse than usual    Patient rated sleepiness: Not sleepy or tired    PAP treatment:yes: Post PAP treatment patient reports feeling unchanged and would wear PAP mask at home

## 2020-02-19 ENCOUNTER — TELEPHONE (OUTPATIENT)
Dept: PULMONOLOGY | Facility: CLINIC | Age: 45
End: 2020-02-19

## 2020-02-19 DIAGNOSIS — G47.33 OSA (OBSTRUCTIVE SLEEP APNEA): Primary | ICD-10-CM

## 2020-03-06 DIAGNOSIS — G47.33 OSA (OBSTRUCTIVE SLEEP APNEA): Primary | ICD-10-CM

## 2020-03-25 ENCOUNTER — TELEPHONE (OUTPATIENT)
Dept: SLEEP CENTER | Facility: CLINIC | Age: 45
End: 2020-03-25

## 2020-04-23 ENCOUNTER — TELEMEDICINE (OUTPATIENT)
Dept: PULMONOLOGY | Facility: CLINIC | Age: 45
End: 2020-04-23
Payer: COMMERCIAL

## 2020-04-23 DIAGNOSIS — J30.2 SEASONAL ALLERGIES: ICD-10-CM

## 2020-04-23 DIAGNOSIS — G47.61 PLMD (PERIODIC LIMB MOVEMENT DISORDER): ICD-10-CM

## 2020-04-23 DIAGNOSIS — G47.33 OSA (OBSTRUCTIVE SLEEP APNEA): Primary | ICD-10-CM

## 2020-04-23 DIAGNOSIS — E66.3 OVERWEIGHT: ICD-10-CM

## 2020-04-23 PROCEDURE — 99214 OFFICE O/P EST MOD 30 MIN: CPT | Performed by: PHYSICIAN ASSISTANT

## 2020-06-08 ENCOUNTER — TELEMEDICINE (OUTPATIENT)
Dept: PULMONOLOGY | Facility: CLINIC | Age: 45
End: 2020-06-08
Payer: COMMERCIAL

## 2020-06-08 ENCOUNTER — TELEPHONE (OUTPATIENT)
Dept: PULMONOLOGY | Facility: CLINIC | Age: 45
End: 2020-06-08

## 2020-06-08 DIAGNOSIS — E66.3 OVERWEIGHT: ICD-10-CM

## 2020-06-08 DIAGNOSIS — E78.2 HYPERLIPIDEMIA, MIXED: ICD-10-CM

## 2020-06-08 DIAGNOSIS — J30.2 SEASONAL ALLERGIES: ICD-10-CM

## 2020-06-08 DIAGNOSIS — G47.33 OSA (OBSTRUCTIVE SLEEP APNEA): Primary | ICD-10-CM

## 2020-06-08 PROCEDURE — 99213 OFFICE O/P EST LOW 20 MIN: CPT | Performed by: PHYSICIAN ASSISTANT

## 2020-08-17 DIAGNOSIS — L23.7 POISON IVY: Primary | ICD-10-CM

## 2020-08-17 RX ORDER — METHYLPREDNISOLONE 4 MG/1
TABLET ORAL
Qty: 21 TABLET | Refills: 0 | Status: SHIPPED | OUTPATIENT
Start: 2020-08-17 | End: 2020-08-23

## 2020-08-31 ENCOUNTER — TELEPHONE (OUTPATIENT)
Dept: OTHER | Facility: OTHER | Age: 45
End: 2020-08-31

## 2020-09-01 ENCOUNTER — TELEPHONE (OUTPATIENT)
Dept: PULMONOLOGY | Facility: CLINIC | Age: 45
End: 2020-09-01

## 2020-09-01 DIAGNOSIS — G47.33 OSA (OBSTRUCTIVE SLEEP APNEA): Primary | ICD-10-CM

## 2020-09-01 NOTE — TELEPHONE ENCOUNTER
Pt called stating that he is waking up several times per night  He has complaints of his abdomen being distended in the morning  He is asking for 1 steady pressure  Compliance is on your desk   Please advise no chest pain

## 2020-12-07 DIAGNOSIS — J30.1 SEASONAL ALLERGIC RHINITIS DUE TO POLLEN: ICD-10-CM

## 2020-12-07 RX ORDER — FLUTICASONE PROPIONATE 50 MCG
SPRAY, SUSPENSION (ML) NASAL
Qty: 16 ML | Refills: 3 | Status: SHIPPED | OUTPATIENT
Start: 2020-12-07 | End: 2021-04-28 | Stop reason: SDUPTHER

## 2020-12-07 RX ORDER — FLUTICASONE PROPIONATE 50 MCG
2 SPRAY, SUSPENSION (ML) NASAL DAILY
Qty: 16 ML | Refills: 3 | Status: SHIPPED | OUTPATIENT
Start: 2020-12-07 | End: 2021-02-22 | Stop reason: SDUPTHER

## 2021-02-22 ENCOUNTER — OFFICE VISIT (OUTPATIENT)
Dept: FAMILY MEDICINE CLINIC | Facility: CLINIC | Age: 46
End: 2021-02-22
Payer: COMMERCIAL

## 2021-02-22 VITALS
OXYGEN SATURATION: 99 % | DIASTOLIC BLOOD PRESSURE: 90 MMHG | WEIGHT: 212.2 LBS | TEMPERATURE: 97.3 F | HEIGHT: 72 IN | BODY MASS INDEX: 28.74 KG/M2 | HEART RATE: 86 BPM | SYSTOLIC BLOOD PRESSURE: 138 MMHG

## 2021-02-22 DIAGNOSIS — Z00.00 ANNUAL PHYSICAL EXAM: Primary | ICD-10-CM

## 2021-02-22 DIAGNOSIS — Z13.6 SCREENING FOR CARDIOVASCULAR CONDITION: ICD-10-CM

## 2021-02-22 PROCEDURE — 99396 PREV VISIT EST AGE 40-64: CPT | Performed by: FAMILY MEDICINE

## 2021-02-22 PROCEDURE — 1036F TOBACCO NON-USER: CPT | Performed by: FAMILY MEDICINE

## 2021-02-22 PROCEDURE — 3008F BODY MASS INDEX DOCD: CPT | Performed by: FAMILY MEDICINE

## 2021-02-22 PROCEDURE — 3725F SCREEN DEPRESSION PERFORMED: CPT | Performed by: FAMILY MEDICINE

## 2021-02-22 NOTE — PATIENT INSTRUCTIONS

## 2021-02-22 NOTE — PROGRESS NOTES
[address]    NAME: Tobi Porter  AGE: 54 y o  SEX: male  : 1975     DATE: 2021f     Assessment and Plan:     Problem List Items Addressed This Visit     None      Visit Diagnoses     Annual physical exam    -  Primary          Immunizations and preventive care screenings were discussed with patient today  Appropriate education was printed on patient's after visit summary  Counseling:  · Dental Health: discussed importance of regular tooth brushing, flossing, and dental visits  BMI Counseling: Body mass index is 28 78 kg/m²  The BMI is above normal  Nutrition recommendations include decreasing portion sizes and encouraging healthy choices of fruits and vegetables  Exercise recommendations include moderate physical activity 150 minutes/week  No pharmacotherapy was ordered  Return in about 1 year (around 2022)  Chief Complaint:     Chief Complaint   Patient presents with    Annual Exam      History of Present Illness:     Adult Annual Physical   Patient here for a comprehensive physical exam  The patient reports no problems  Diet and Physical Activity  · Diet/Nutrition: well balanced diet  · Exercise: moderate cardiovascular exercise  Depression Screening  PHQ-9 Depression Screening    PHQ-9:   Frequency of the following problems over the past two weeks:      Little interest or pleasure in doing things: 0 - not at all  Feeling down, depressed, or hopeless: 0 - not at all  PHQ-2 Score: 0       General Health  · Sleep: sleeps well  · Hearing: normal - bilateral   · Vision: no vision problems  · Dental: regular dental visits   Health  · Symptoms include: none     Review of Systems:     Review of Systems   Constitutional: Negative for chills, fatigue and fever     HENT: Negative for congestion, ear pain, hearing loss, postnasal drip, rhinorrhea and sore throat  Eyes: Negative for pain and visual disturbance  Respiratory: Negative for chest tightness, shortness of breath and wheezing  Cardiovascular: Negative for chest pain and leg swelling  Gastrointestinal: Negative for abdominal distention, abdominal pain, constipation, diarrhea and vomiting  Endocrine: Negative for cold intolerance and heat intolerance  Genitourinary: Negative for difficulty urinating, frequency and urgency  Musculoskeletal: Negative for arthralgias and gait problem  Skin: Negative for color change  Neurological: Negative for dizziness, tremors, syncope, numbness and headaches  Hematological: Negative for adenopathy  Psychiatric/Behavioral: Negative for agitation, confusion and sleep disturbance  The patient is not nervous/anxious         Past Medical History:     Past Medical History:   Diagnosis Date    MAU (obstructive sleep apnea)     Temporomandibular joint tender     [id number] resolved      Past Surgical History:     Past Surgical History:   Procedure Laterality Date    INGUINAL HERNIA REPAIR      VASECTOMY        Family History:     Family History   Problem Relation Age of Onset    Diabetes Father     Colon cancer Sister     Sleep apnea Brother     Sleep apnea Brother       Social History:        Social History     Socioeconomic History    Marital status: /Civil Union     Spouse name: None    Number of children: None    Years of education: None    Highest education level: None   Occupational History    None   Social Needs    Financial resource strain: None    Food insecurity     Worry: None     Inability: None    Transportation needs     Medical: None     Non-medical: None   Tobacco Use    Smoking status: Never Smoker    Smokeless tobacco: Never Used   Substance and Sexual Activity    Alcohol use: Yes     Comment: occasionally    Drug use: No    Sexual activity: None   Lifestyle    Physical activity     Days per week: None Minutes per session: None    Stress: None   Relationships    Social connections     Talks on phone: None     Gets together: None     Attends [employer] service: None     Active member of club or organization: None     Attends meetings of clubs or organizations: None     Relationship status: None    Intimate partner violence     Fear of current or ex partner: None     Emotionally abused: None     Physically abused: None     Forced sexual activity: None   Other Topics Concern    None   Social History Narrative    None      Current Medications:     Current Outpatient Medications   Medication Sig Dispense Refill    fluticasone (FLONASE) 50 mcg/act nasal spray SPRAY 2 SPRAYS INTO EACH NOSTRIL EVERY DAY 16 mL 3    ibuprofen (ADVIL) 200 mg tablet Take by mouth       No current facility-administered medications for this visit  Allergies:     No Known Allergies   Physical Exam:     /90 (BP Location: Left arm, Patient Position: Sitting)   Pulse 86   Temp (!) 97 3 °F (36 3 °C) (Tympanic)   Ht 6' (1 829 m)   Wt 96 3 kg (212 lb 3 2 oz)   SpO2 99%   BMI 28 78 kg/m²     Physical Exam  Constitutional:       Appearance: He is well-developed  HENT:      Head: Normocephalic  Right Ear: External ear normal       Left Ear: External ear normal       Nose: Nose normal    Eyes:      Conjunctiva/sclera: Conjunctivae normal       Pupils: Pupils are equal, round, and reactive to light  Neck:      Musculoskeletal: Normal range of motion  Thyroid: No thyromegaly  Cardiovascular:      Rate and Rhythm: Normal rate and regular rhythm  Heart sounds: Normal heart sounds  Pulmonary:      Effort: Pulmonary effort is normal       Breath sounds: Normal breath sounds  Abdominal:      General: Bowel sounds are normal       Palpations: Abdomen is soft  Musculoskeletal: Normal range of motion  Skin:     General: Skin is warm and dry     Neurological:      Mental Status: He is alert and oriented to person, place, and time     Psychiatric:         Behavior: Behavior normal           DO Viola Boo 1529 [id number]

## 2021-02-27 ENCOUNTER — LAB (OUTPATIENT)
Dept: LAB | Facility: HOSPITAL | Age: 46
End: 2021-02-27
Attending: FAMILY MEDICINE
Payer: COMMERCIAL

## 2021-02-27 DIAGNOSIS — Z13.6 SCREENING FOR CARDIOVASCULAR CONDITION: ICD-10-CM

## 2021-02-27 LAB
ALBUMIN SERPL BCP-MCNC: 3.8 G/DL (ref 3.5–5)
ALP SERPL-CCNC: 55 U/L (ref 46–116)
ALT SERPL W P-5'-P-CCNC: 34 U/L (ref 12–78)
ANION GAP SERPL CALCULATED.3IONS-SCNC: 11 MMOL/L (ref 4–13)
AST SERPL W P-5'-P-CCNC: 19 U/L (ref 5–45)
BILIRUB SERPL-MCNC: 0.4 MG/DL (ref 0.2–1)
BUN SERPL-MCNC: 16 MG/DL (ref 5–25)
CALCIUM SERPL-MCNC: 9.1 MG/DL (ref 8.3–10.1)
CHLORIDE SERPL-SCNC: 104 MMOL/L (ref 100–108)
CHOLEST SERPL-MCNC: 189 MG/DL (ref 50–200)
CO2 SERPL-SCNC: 27 MMOL/L (ref 21–32)
CREAT SERPL-MCNC: 1.09 MG/DL (ref 0.6–1.3)
GFR SERPL CREATININE-BSD FRML MDRD: 81 ML/MIN/1.73SQ M
GLUCOSE P FAST SERPL-MCNC: 99 MG/DL (ref 65–99)
HDLC SERPL-MCNC: 40 MG/DL
LDLC SERPL CALC-MCNC: 126 MG/DL (ref 0–100)
NONHDLC SERPL-MCNC: 149 MG/DL
POTASSIUM SERPL-SCNC: 3.9 MMOL/L (ref 3.5–5.3)
PROT SERPL-MCNC: 7 G/DL (ref 6.4–8.2)
SODIUM SERPL-SCNC: 142 MMOL/L (ref 136–145)
TRIGL SERPL-MCNC: 116 MG/DL

## 2021-02-27 PROCEDURE — 80061 LIPID PANEL: CPT

## 2021-02-27 PROCEDURE — 80053 COMPREHEN METABOLIC PANEL: CPT

## 2021-02-27 PROCEDURE — 36415 COLL VENOUS BLD VENIPUNCTURE: CPT

## 2021-04-05 DIAGNOSIS — Z23 ENCOUNTER FOR IMMUNIZATION: ICD-10-CM

## 2021-04-28 DIAGNOSIS — J30.1 SEASONAL ALLERGIC RHINITIS DUE TO POLLEN: ICD-10-CM

## 2021-04-28 RX ORDER — FLUTICASONE PROPIONATE 50 MCG
2 SPRAY, SUSPENSION (ML) NASAL DAILY
Qty: 16 ML | Refills: 5 | Status: SHIPPED | OUTPATIENT
Start: 2021-04-28 | End: 2021-11-19

## 2021-06-02 ENCOUNTER — OFFICE VISIT (OUTPATIENT)
Dept: FAMILY MEDICINE CLINIC | Facility: CLINIC | Age: 46
End: 2021-06-02
Payer: COMMERCIAL

## 2021-06-02 VITALS
SYSTOLIC BLOOD PRESSURE: 120 MMHG | OXYGEN SATURATION: 98 % | HEIGHT: 72 IN | TEMPERATURE: 97.8 F | WEIGHT: 211 LBS | DIASTOLIC BLOOD PRESSURE: 82 MMHG | HEART RATE: 75 BPM | BODY MASS INDEX: 28.58 KG/M2

## 2021-06-02 DIAGNOSIS — L73.9 FOLLICULITIS: Primary | ICD-10-CM

## 2021-06-02 PROCEDURE — 3725F SCREEN DEPRESSION PERFORMED: CPT | Performed by: FAMILY MEDICINE

## 2021-06-02 PROCEDURE — 3008F BODY MASS INDEX DOCD: CPT | Performed by: FAMILY MEDICINE

## 2021-06-02 PROCEDURE — 1036F TOBACCO NON-USER: CPT | Performed by: FAMILY MEDICINE

## 2021-06-02 PROCEDURE — 99213 OFFICE O/P EST LOW 20 MIN: CPT | Performed by: FAMILY MEDICINE

## 2021-06-02 NOTE — PROGRESS NOTES
Assessment/Plan:         Problem List Items Addressed This Visit     None      Visit Diagnoses     Folliculitis    -  Primary    Relevant Medications    mupirocin (BACTROBAN) 2 % ointment            Subjective:      Patient ID: Steven Bullard is a 51 y o  male  Patient complaining of a small bump on his left anterior neck  Bleeds when he cuts it when shaving  Does drain some pus as well  The following portions of the patient's history were reviewed and updated as appropriate:   Past Medical History:  He has a past medical history of MAU (obstructive sleep apnea) and Temporomandibular joint tender  ,  _______________________________________________________________________  Medical Problems:  does not have any pertinent problems on file ,  _______________________________________________________________________  Past Surgical History:   has a past surgical history that includes Inguinal hernia repair and Vasectomy  ,  _______________________________________________________________________  Family History:  family history includes Colon cancer in his sister; Diabetes in his father; Sleep apnea in his brother and brother ,  _______________________________________________________________________  Social History:   reports that he has never smoked  He has never used smokeless tobacco  He reports current alcohol use  He reports that he does not use drugs  ,  _______________________________________________________________________  Allergies:  has No Known Allergies     _______________________________________________________________________  Current Outpatient Medications   Medication Sig Dispense Refill    fluticasone (FLONASE) 50 mcg/act nasal spray 2 sprays into each nostril daily 16 mL 5    ibuprofen (ADVIL) 200 mg tablet Take by mouth      mupirocin (BACTROBAN) 2 % ointment Apply topically 3 (three) times a day 22 g 0     No current facility-administered medications for this visit  _______________________________________________________________________  Review of Systems   Constitutional: Negative for chills, fatigue and fever  HENT: Negative for congestion, ear pain, hearing loss, postnasal drip, rhinorrhea and sore throat  Eyes: Negative for pain and visual disturbance  Respiratory: Negative for chest tightness, shortness of breath and wheezing  Cardiovascular: Negative for chest pain and leg swelling  Gastrointestinal: Negative for abdominal distention, abdominal pain, constipation, diarrhea and vomiting  Endocrine: Negative for cold intolerance and heat intolerance  Genitourinary: Negative for difficulty urinating, frequency and urgency  Musculoskeletal: Positive for neck pain  Negative for arthralgias and gait problem  Skin: Negative for color change  Neurological: Negative for dizziness, tremors, syncope, numbness and headaches  Hematological: Negative for adenopathy  Psychiatric/Behavioral: Negative for agitation, confusion and sleep disturbance  The patient is not nervous/anxious  Objective:  Vitals:    06/02/21 1505   BP: 120/82   BP Location: Left arm   Patient Position: Sitting   Cuff Size: Adult   Pulse: 75   Temp: 97 8 °F (36 6 °C)   TempSrc: Tympanic   SpO2: 98%   Weight: 95 7 kg (211 lb)   Height: 6' (1 829 m)     Body mass index is 28 62 kg/m²  Physical Exam  Vitals signs and nursing note reviewed  Constitutional:       Appearance: He is well-developed  HENT:      Head: Normocephalic  Eyes:      General: No scleral icterus  Conjunctiva/sclera: Conjunctivae normal    Neck:      Musculoskeletal: Normal range of motion  Thyroid: No thyromegaly  Cardiovascular:      Rate and Rhythm: Normal rate and regular rhythm  Heart sounds: Normal heart sounds  No murmur  Pulmonary:      Effort: Pulmonary effort is normal  No respiratory distress  Breath sounds: Normal breath sounds  No wheezing     Abdominal:      General: Bowel sounds are normal  There is no distension  Palpations: Abdomen is soft  Tenderness: There is no abdominal tenderness  Musculoskeletal: Normal range of motion  General: No tenderness  Lymphadenopathy:      Cervical: No cervical adenopathy  Skin:     General: Skin is warm and dry  Coloration: Skin is not pale  Findings: No rash  Comments: 5 mm Pustule left anterior   Neurological:      Mental Status: He is alert and oriented to person, place, and time  Cranial Nerves: No cranial nerve deficit     Psychiatric:         Behavior: Behavior normal

## 2021-11-19 DIAGNOSIS — J30.1 SEASONAL ALLERGIC RHINITIS DUE TO POLLEN: ICD-10-CM

## 2021-11-19 RX ORDER — FLUTICASONE PROPIONATE 50 MCG
SPRAY, SUSPENSION (ML) NASAL
Qty: 48 ML | Refills: 1 | Status: SHIPPED | OUTPATIENT
Start: 2021-11-19 | End: 2022-08-06

## 2022-02-28 ENCOUNTER — OFFICE VISIT (OUTPATIENT)
Dept: FAMILY MEDICINE CLINIC | Facility: CLINIC | Age: 47
End: 2022-02-28
Payer: COMMERCIAL

## 2022-02-28 VITALS
SYSTOLIC BLOOD PRESSURE: 118 MMHG | HEIGHT: 72 IN | HEART RATE: 85 BPM | TEMPERATURE: 97.8 F | OXYGEN SATURATION: 98 % | BODY MASS INDEX: 28.31 KG/M2 | DIASTOLIC BLOOD PRESSURE: 74 MMHG | WEIGHT: 209 LBS

## 2022-02-28 DIAGNOSIS — Z00.00 ANNUAL PHYSICAL EXAM: Primary | ICD-10-CM

## 2022-02-28 DIAGNOSIS — G47.33 OSA (OBSTRUCTIVE SLEEP APNEA): ICD-10-CM

## 2022-02-28 DIAGNOSIS — Z13.6 SCREENING FOR CARDIOVASCULAR CONDITION: ICD-10-CM

## 2022-02-28 PROCEDURE — 99396 PREV VISIT EST AGE 40-64: CPT | Performed by: FAMILY MEDICINE

## 2022-02-28 NOTE — PROGRESS NOTES
[address]    NAME: Dori Miller  AGE: 51 y o  SEX: male  : 1975     DATE: 2022     Assessment and Plan:     Problem List Items Addressed This Visit     None      Visit Diagnoses     Annual physical exam    -  Primary    BMI 28 0-28 9,adult              Immunizations and preventive care screenings were discussed with patient today  Appropriate education was printed on patient's after visit summary  Counseling:  Alcohol/drug use: discussed moderation in alcohol intake, the recommendations for healthy alcohol use, and avoidance of illicit drug use  · Dental Health: discussed importance of regular tooth brushing, flossing, and dental visits  BMI Counseling: Body mass index is 28 35 kg/m²  The BMI is above normal  Nutrition recommendations include decreasing portion sizes and encouraging healthy choices of fruits and vegetables  Exercise recommendations include moderate physical activity 150 minutes/week  No pharmacotherapy was ordered  Rationale for BMI follow-up plan is due to patient being overweight or obese  Depression Screening and Follow-up Plan: Patient was screened for depression during today's encounter  They screened negative with a PHQ-2 score of 0  Return in 1 year (on 2023)  Chief Complaint:     Chief Complaint   Patient presents with    Physical Exam      History of Present Illness:     Adult Annual Physical   Patient here for a comprehensive physical exam  The patient reports no problems  Diet and Physical Activity  · Diet/Nutrition: well balanced diet  · Exercise: no formal exercise        Depression Screening  PHQ-2/9 Depression Screening    Little interest or pleasure in doing things: 0 - not at all  Feeling down, depressed, or hopeless: 0 - not at all  PHQ-2 Score: 0  PHQ-2 Interpretation: Negative depression screen       General Health  · Sleep: sleeps well  · Hearing: normal - bilateral   · Vision: no vision problems  · Dental: regular dental visits   Health  · Symptoms include: none     Review of Systems:     Review of Systems   Constitutional: Negative for chills, fatigue and fever  HENT: Negative for congestion, ear pain, hearing loss, postnasal drip, rhinorrhea and sore throat  Eyes: Negative for pain and visual disturbance  Respiratory: Negative for chest tightness, shortness of breath and wheezing  Cardiovascular: Negative for chest pain and leg swelling  Gastrointestinal: Negative for abdominal distention, abdominal pain, constipation, diarrhea and vomiting  Endocrine: Negative for cold intolerance and heat intolerance  Genitourinary: Negative for difficulty urinating, frequency and urgency  Musculoskeletal: Negative for arthralgias and gait problem  Skin: Negative for color change  Neurological: Negative for dizziness, tremors, syncope, numbness and headaches  Hematological: Negative for adenopathy  Psychiatric/Behavioral: Negative for agitation, confusion and sleep disturbance  The patient is not nervous/anxious         Past Medical History:     Past Medical History:   Diagnosis Date    MAU (obstructive sleep apnea)     Temporomandibular joint tender     [id number] resolved      Past Surgical History:     Past Surgical History:   Procedure Laterality Date    INGUINAL HERNIA REPAIR      VASECTOMY        Family History:     Family History   Problem Relation Age of Onset    Diabetes Father     Colon cancer Sister     Sleep apnea Brother     Sleep apnea Brother       Social History:     Social History     Socioeconomic History    Marital status: /Civil Union     Spouse name: None    Number of children: None    Years of education: None    Highest education level: None   Occupational History    None   Tobacco Use    Smoking status: Never Smoker    Smokeless tobacco: Never Used Substance and Sexual Activity    Alcohol use: Yes     Comment: occasionally    Drug use: No    Sexual activity: None   Other Topics Concern    None   Social History Narrative    None     Social Determinants of Health     Financial Resource Strain: Not on file   Food Insecurity: Not on file   Transportation Needs: Not on file   Physical Activity: Not on file   Stress: Not on file   Social Connections: Not on file   Intimate Partner Violence: Not on file   Housing Stability: Not on file      Current Medications:     Current Outpatient Medications   Medication Sig Dispense Refill    fluticasone (FLONASE) 50 mcg/act nasal spray SPRAY 2 SPRAYS INTO EACH NOSTRIL EVERY DAY 48 mL 1    ibuprofen (ADVIL) 200 mg tablet Take by mouth      mupirocin (BACTROBAN) 2 % ointment Apply topically 3 (three) times a day 22 g 0     No current facility-administered medications for this visit  Allergies:     No Known Allergies   Physical Exam:     /74   Pulse 85   Temp 97 8 °F (36 6 °C)   Ht 6' (1 829 m)   Wt 94 8 kg (209 lb)   SpO2 98%   BMI 28 35 kg/m²     Physical Exam  Constitutional:       Appearance: He is well-developed  HENT:      Head: Normocephalic  Right Ear: External ear normal       Left Ear: External ear normal       Nose: Nose normal    Eyes:      Conjunctiva/sclera: Conjunctivae normal       Pupils: Pupils are equal, round, and reactive to light  Neck:      Thyroid: No thyromegaly  Cardiovascular:      Rate and Rhythm: Normal rate and regular rhythm  Heart sounds: Normal heart sounds  Pulmonary:      Effort: Pulmonary effort is normal       Breath sounds: Normal breath sounds  Abdominal:      General: Bowel sounds are normal       Palpations: Abdomen is soft  Musculoskeletal:         General: Normal range of motion  Cervical back: Normal range of motion  Skin:     General: Skin is warm and dry     Neurological:      Mental Status: He is alert and oriented to person, place, and time     Psychiatric:         Behavior: Behavior normal           DO Viola Mustafa [id number] [facility]

## 2022-02-28 NOTE — PATIENT INSTRUCTIONS
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
## 2022-03-29 RX ORDER — DOXYCYCLINE HYCLATE 100 MG/1
100 CAPSULE ORAL EVERY 12 HOURS SCHEDULED
Qty: 14 CAPSULE | Refills: 0 | Status: SHIPPED | OUTPATIENT
Start: 2022-03-29 | End: 2022-04-05

## 2022-04-02 ENCOUNTER — APPOINTMENT (OUTPATIENT)
Dept: LAB | Facility: HOSPITAL | Age: 47
End: 2022-04-02
Payer: COMMERCIAL

## 2022-04-02 DIAGNOSIS — Z13.6 SCREENING FOR CARDIOVASCULAR CONDITION: ICD-10-CM

## 2022-04-02 LAB
ALBUMIN SERPL BCP-MCNC: 4 G/DL (ref 3.5–5)
ALP SERPL-CCNC: 49 U/L (ref 46–116)
ALT SERPL W P-5'-P-CCNC: 29 U/L (ref 12–78)
ANION GAP SERPL CALCULATED.3IONS-SCNC: 7 MMOL/L (ref 4–13)
AST SERPL W P-5'-P-CCNC: 18 U/L (ref 5–45)
BILIRUB SERPL-MCNC: 0.7 MG/DL (ref 0.2–1)
BUN SERPL-MCNC: 14 MG/DL (ref 5–25)
CALCIUM SERPL-MCNC: 9 MG/DL (ref 8.3–10.1)
CHLORIDE SERPL-SCNC: 107 MMOL/L (ref 100–108)
CHOLEST SERPL-MCNC: 182 MG/DL
CO2 SERPL-SCNC: 28 MMOL/L (ref 21–32)
CREAT SERPL-MCNC: 1.09 MG/DL (ref 0.6–1.3)
GFR SERPL CREATININE-BSD FRML MDRD: 79 ML/MIN/1.73SQ M
GLUCOSE P FAST SERPL-MCNC: 96 MG/DL (ref 65–99)
HDLC SERPL-MCNC: 44 MG/DL
LDLC SERPL CALC-MCNC: 116 MG/DL (ref 0–100)
NONHDLC SERPL-MCNC: 138 MG/DL
POTASSIUM SERPL-SCNC: 3.8 MMOL/L (ref 3.5–5.3)
PROT SERPL-MCNC: 6.9 G/DL (ref 6.4–8.2)
SODIUM SERPL-SCNC: 142 MMOL/L (ref 136–145)
TRIGL SERPL-MCNC: 109 MG/DL

## 2022-04-02 PROCEDURE — 80053 COMPREHEN METABOLIC PANEL: CPT

## 2022-04-02 PROCEDURE — 36415 COLL VENOUS BLD VENIPUNCTURE: CPT

## 2022-04-02 PROCEDURE — 80061 LIPID PANEL: CPT

## 2022-07-07 ENCOUNTER — TELEPHONE (OUTPATIENT)
Dept: OTHER | Facility: OTHER | Age: 47
End: 2022-07-07

## 2022-07-07 NOTE — TELEPHONE ENCOUNTER
Patient reports he uses a CPAP at night and was told by the supply company he hasn't been seen for 2 years and should make an appointment to be seen  Patient was wondering if he has to be seen in the office or if he can do a virtual appointment to have his orders renewed for his equipment  Patient would like a call back to advise

## 2022-08-06 DIAGNOSIS — J30.1 SEASONAL ALLERGIC RHINITIS DUE TO POLLEN: ICD-10-CM

## 2022-08-06 RX ORDER — FLUTICASONE PROPIONATE 50 MCG
SPRAY, SUSPENSION (ML) NASAL
Qty: 48 ML | Refills: 1 | Status: SHIPPED | OUTPATIENT
Start: 2022-08-06

## 2022-08-17 ENCOUNTER — NURSE TRIAGE (OUTPATIENT)
Dept: OTHER | Facility: OTHER | Age: 47
End: 2022-08-17

## 2022-08-17 ENCOUNTER — HOSPITAL ENCOUNTER (OUTPATIENT)
Dept: RADIOLOGY | Facility: HOSPITAL | Age: 47
Discharge: HOME | End: 2022-08-17
Payer: COMMERCIAL

## 2022-08-17 ENCOUNTER — TELEMEDICINE (OUTPATIENT)
Dept: PULMONOLOGY | Facility: CLINIC | Age: 47
End: 2022-08-17
Payer: COMMERCIAL

## 2022-08-17 ENCOUNTER — OFFICE VISIT (OUTPATIENT)
Dept: FAMILY MEDICINE CLINIC | Facility: CLINIC | Age: 47
End: 2022-08-17
Payer: COMMERCIAL

## 2022-08-17 VITALS — TEMPERATURE: 99 F | BODY MASS INDEX: 28.21 KG/M2 | HEIGHT: 72 IN

## 2022-08-17 VITALS
DIASTOLIC BLOOD PRESSURE: 70 MMHG | HEART RATE: 82 BPM | BODY MASS INDEX: 28.17 KG/M2 | OXYGEN SATURATION: 97 % | HEIGHT: 72 IN | SYSTOLIC BLOOD PRESSURE: 130 MMHG | WEIGHT: 208 LBS | TEMPERATURE: 97.7 F

## 2022-08-17 DIAGNOSIS — G47.33 OSA ON CPAP: Primary | ICD-10-CM

## 2022-08-17 DIAGNOSIS — M79.644 PAIN IN FINGER OF RIGHT HAND: Primary | ICD-10-CM

## 2022-08-17 DIAGNOSIS — Z99.89 OSA ON CPAP: Primary | ICD-10-CM

## 2022-08-17 DIAGNOSIS — M79.644 PAIN IN FINGER OF RIGHT HAND: ICD-10-CM

## 2022-08-17 PROCEDURE — 99213 OFFICE O/P EST LOW 20 MIN: CPT | Performed by: PHYSICIAN ASSISTANT

## 2022-08-17 PROCEDURE — 73140 X-RAY EXAM OF FINGER(S): CPT

## 2022-08-17 NOTE — TELEPHONE ENCOUNTER
Regarding: finger injury  ----- Message from Jane Butcher sent at 8/17/2022  6:53 AM EDT -----  "I shut my right 2nd digit in a door   I have bruising under my nail "

## 2022-08-17 NOTE — PROGRESS NOTES
Assessment/Plan:          Diagnoses and all orders for this visit:    Pain in finger of right hand  -     XR finger right second digit-index; Future      Pt may take Ibuprofen as needed for pain      Subjective:      Patient ID: Germán Russell is a 49 y o  male  Pt closed his right index finger in a heavy door  He is having pain in the proximal right index finger with movement and palpation  He feels throbbing sensation when hand is down  He has been splinting it with minimal relief  He took Advil 200 mg this morning  The following portions of the patient's history were reviewed and updated as appropriate:   He has a past medical history of MAU (obstructive sleep apnea) and Temporomandibular joint tender  ,  does not have any pertinent problems on file  ,   has a past surgical history that includes Inguinal hernia repair and Vasectomy  ,  family history includes Colon cancer in his sister; Diabetes in his father; Sleep apnea in his brother and brother  ,   reports that he has never smoked  He has never used smokeless tobacco  He reports current alcohol use  He reports that he does not use drugs  ,  has No Known Allergies     Current Outpatient Medications   Medication Sig Dispense Refill    fluticasone (FLONASE) 50 mcg/act nasal spray SPRAY 2 SPRAYS INTO EACH NOSTRIL EVERY DAY 48 mL 1    ibuprofen (MOTRIN) 200 mg tablet Take by mouth      mupirocin (BACTROBAN) 2 % ointment Apply topically 3 (three) times a day (Patient not taking: Reported on 8/17/2022) 22 g 0     No current facility-administered medications for this visit  Review of Systems   Constitutional: Negative for chills and fever  Musculoskeletal: Positive for joint swelling  Right index finger swollen and painful  Skin: Positive for color change           Objective:  Vitals:    08/17/22 0928   BP: 130/70   BP Location: Left arm   Patient Position: Sitting   Cuff Size: Adult   Pulse: 82   Temp: 97 7 °F (36 5 °C)   TempSrc: Tympanic SpO2: 97%   Weight: 94 3 kg (208 lb)   Height: 6' (1 829 m)     Body mass index is 28 21 kg/m²  Physical Exam  Vitals and nursing note reviewed  Constitutional:       Appearance: Normal appearance  HENT:      Head: Normocephalic and atraumatic  Cardiovascular:      Rate and Rhythm: Normal rate  Pulmonary:      Effort: Pulmonary effort is normal    Musculoskeletal:         General: Swelling and tenderness present  No deformity  Normal range of motion  Comments: Right index finger with pain in PIP joint with palpation and movement  Finger is swollen   Skin:     General: Skin is warm and dry  Findings: Bruising present  No erythema  Comments: Bruising of nailbed right index finger   Neurological:      Mental Status: He is alert and oriented to person, place, and time     Psychiatric:         Mood and Affect: Mood normal          Behavior: Behavior normal

## 2022-08-17 NOTE — TELEPHONE ENCOUNTER
Reason for Disposition   [1] SEVERE pain AND [2] not improved 2 hours after pain medicine/ice packs    Answer Assessment - Initial Assessment Questions  1  MECHANISM: "How did the injury happen?"        Patient shut the right hand pointed finger in a door  2  ONSET: "When did the injury happen?" (Minutes or hours ago)        Last night   3  LOCATION: "What part of the finger is injured?" "Is the nail damaged?"         Right pointing finger   4  APPEARANCE of the INJURY: "What does the injury look like?"        Nail looks black and purple  5  SEVERITY: "Can you use the hand normally?"  "Can you bend your fingers into a ball and then fully open them?"       Patient cannot  anything with the finger, cannot use it  6  SIZE: For cuts, bruises, or swelling, ask: "How large is it?" (e g , inches or centimeters;  entire finger)       Half way up the nail  7  PAIN: "Is there pain?" If Yes, ask: "How bad is the pain?"    (e g , Scale 1-10; or mild, moderate, severe)   - NONE (0): no pain  - MILD (1-3): doesn't interfere with normal activities  - MODERATE (4-7): interferes with normal activities or awakens from sleep  - SEVERE (8-10): excruciating pain, unable to hold a glass of water or bend finger even a little  Severe, 10 out of 10   8  TETANUS: For any breaks in the skin, ask: "When was the last tetanus booster?"      No skin break   9  OTHER SYMPTOMS: "Do you have any other symptoms?"      No    Protocols used:  FINGER INJURY-ADULT-

## 2022-08-18 NOTE — PROGRESS NOTES
Virtual Regular Visit    Verification of patient location:    Patient is located in the following state in which I hold an active license [place]      Assessment/Plan:    Problem List Items Addressed This Visit    None     Visit Diagnoses     MAU on CPAP    -  Primary    Relevant Orders    PAP DME Resupply/Reorder        Video visit was done today for follow-up  Patient was last seen about 2 years ago  Continues on his CPAP on a nightly basis  Has been doing well since we adjusted the pressures after his last visit without further bloating  He will continue with the current settings, awaiting download compliance but suspect his compliance is good  He will follow-up with us on an annual basis, obtain new supplies every 3-6 months  Reason for visit is   Chief Complaint   Patient presents with    Follow-up    Sleep Apnea    Virtual Regular Visit        Encounter provider Jackie Wheeler PA-C    Provider located at [address]  [facility] [place] [address]      Recent Visits  No visits were found meeting these conditions  Showing recent visits within past 7 days and meeting all other requirements  Today's Visits  Date Type Provider Dept   08/17/22 Office Visit Cathy Gauthier PA-C [facility] [address] [place] [address]   08/17/22 [address] [place] [address] [facility]   Showing today's visits and meeting all other requirements  Future Appointments  No visits were found meeting these conditions  Showing future appointments within next 150 days and meeting all other requirements       The patient was identified by name and date of birth  Mike Hayes was informed that this is a telemedicine visit and that the visit is being conducted through [address] [place] [id number] Now and patient was informed that this is a secure, HIPAA-compliant platform  He agrees to proceed     My office door was closed   No one else was in the room  He acknowledged consent and understanding of privacy and security of the video platform  The patient has agreed to participate and understands they can discontinue the visit at any time  Patient is aware this is a billable service  Subjective  Steven Bullard is a 49 y o  male  Patient is a 41-year-old male with past medical history of MAU, hyperlipidemia  Video visit was done today for follow-up, he was last seen about 2 years ago  He continues with his CPAP on a nightly basis  During his last visit with us he was complaining of bloating during the nighttime with his CPAP  We lowered the max pressure on the CPAP and is currently on a pressure of 4-10  He finds that this is comfortable for him, feels he is getting a good night of sleep and generally feels well rested during the day  Past Medical History:   Diagnosis Date    MAU (obstructive sleep apnea)     Temporomandibular joint tender     [id number] resolved       Past Surgical History:   Procedure Laterality Date    INGUINAL HERNIA REPAIR      VASECTOMY         Current Outpatient Medications   Medication Sig Dispense Refill    fluticasone (FLONASE) 50 mcg/act nasal spray SPRAY 2 SPRAYS INTO EACH NOSTRIL EVERY DAY 48 mL 1    ibuprofen (MOTRIN) 200 mg tablet Take by mouth      mupirocin (BACTROBAN) 2 % ointment Apply topically 3 (three) times a day (Patient not taking: No sig reported) 22 g 0     No current facility-administered medications for this visit  No Known Allergies    Review of Systems   Constitutional: Negative  HENT: Negative  Respiratory: Negative  Cardiovascular: Negative  Gastrointestinal: Negative  Genitourinary: Negative  Musculoskeletal: Negative  Skin: Negative  Allergic/Immunologic: Negative  Neurological: Negative  Psychiatric/Behavioral: Negative          Video Exam    Vitals:    08/17/22 1535   Temp: 99 °F (37 2 °C)   Height: 6' (1 829 m)       Physical Exam  Vitals reviewed  Constitutional:       General: He is not in acute distress  Appearance: Normal appearance  He is not ill-appearing  HENT:      Head: Normocephalic and atraumatic  Eyes:      Conjunctiva/sclera: Conjunctivae normal    Pulmonary:      Effort: Pulmonary effort is normal  No respiratory distress  Abdominal:      General: There is no distension  Musculoskeletal:         General: Normal range of motion  Cervical back: Normal range of motion  Skin:     Coloration: Skin is not pale  Neurological:      Mental Status: He is alert and oriented to person, place, and time     Psychiatric:         Mood and Affect: Mood normal          Behavior: Behavior normal           I spent 7 minutes directly with the patient during this visit

## 2023-03-06 ENCOUNTER — OFFICE VISIT (OUTPATIENT)
Dept: FAMILY MEDICINE CLINIC | Facility: CLINIC | Age: 48
End: 2023-03-06

## 2023-03-06 VITALS
HEART RATE: 83 BPM | OXYGEN SATURATION: 97 % | DIASTOLIC BLOOD PRESSURE: 86 MMHG | SYSTOLIC BLOOD PRESSURE: 120 MMHG | WEIGHT: 209.4 LBS | TEMPERATURE: 96.2 F | BODY MASS INDEX: 28.36 KG/M2 | HEIGHT: 72 IN

## 2023-03-06 DIAGNOSIS — Z00.00 ANNUAL PHYSICAL EXAM: Primary | ICD-10-CM

## 2023-03-06 DIAGNOSIS — Z12.11 SCREEN FOR COLON CANCER: ICD-10-CM

## 2023-03-06 DIAGNOSIS — E78.2 HYPERLIPIDEMIA, MIXED: ICD-10-CM

## 2023-03-06 PROBLEM — J01.90 ACUTE NON-RECURRENT SINUSITIS: Status: RESOLVED | Noted: 2019-01-30 | Resolved: 2023-03-06

## 2023-03-06 NOTE — PROGRESS NOTES
[facility] [address] [facility]    NAME: Arturo Lui  AGE: 49 y o  SEX: male  : 1975     DATE: 3/6/2023     Assessment and Plan:     Problem List Items Addressed This Visit        Other    Hyperlipidemia, mixed   Other Visit Diagnoses     Annual physical exam    -  Primary    BMI 28 0-28 9,adult        Screen for colon cancer        Relevant Orders    Ambulatory referral for colonoscopy          Immunizations and preventive care screenings were discussed with patient today  Appropriate education was printed on patient's after visit summary  Counseling:  Dental Health: discussed importance of regular tooth brushing, flossing, and dental visits  BMI Counseling: Body mass index is 28 4 kg/m²  The BMI is above normal  Nutrition recommendations include decreasing portion sizes and encouraging healthy choices of fruits and vegetables  Exercise recommendations include moderate physical activity 150 minutes/week  No pharmacotherapy was ordered  Rationale for BMI follow-up plan is due to patient being overweight or obese  Depression Screening and Follow-up Plan: Patient was screened for depression during today's encounter  They screened negative with a PHQ-2 score of 0  Return in 1 year (on 3/6/2024)  Chief Complaint:     Chief Complaint   Patient presents with   • Physical Exam      History of Present Illness:     Adult Annual Physical   Patient here for a comprehensive physical exam  The patient reports no problems  Diet and Physical Activity  Diet/Nutrition: well balanced diet  Exercise: moderate cardiovascular exercise        Depression Screening  PHQ-2/9 Depression Screening    Little interest or pleasure in doing things: 0 - not at all  Feeling down, depressed, or hopeless: 0 - not at all  PHQ-2 Score: 0  PHQ-2 Interpretation: Negative depression screen       General Health  Sleep: sleeps well    Hearing: normal - bilateral   Vision: no vision problems  Dental: regular dental visits   Health  Symptoms include: none     Review of Systems:     Review of Systems   Constitutional: Negative for chills, fatigue and fever  HENT: Negative for congestion, ear pain, hearing loss, postnasal drip, rhinorrhea and sore throat  Eyes: Negative for pain and visual disturbance  Respiratory: Negative for chest tightness, shortness of breath and wheezing  Cardiovascular: Negative for chest pain and leg swelling  Gastrointestinal: Negative for abdominal distention, abdominal pain, constipation, diarrhea and vomiting  Endocrine: Negative for cold intolerance and heat intolerance  Genitourinary: Negative for difficulty urinating, frequency and urgency  Musculoskeletal: Negative for arthralgias and gait problem  Skin: Negative for color change  Neurological: Negative for dizziness, tremors, syncope, numbness and headaches  Hematological: Negative for adenopathy  Psychiatric/Behavioral: Negative for agitation, confusion and sleep disturbance  The patient is not nervous/anxious         Past Medical History:     Past Medical History:   Diagnosis Date   • MAU (obstructive sleep apnea)    • Temporomandibular joint tender     [id number] resolved      Past Surgical History:     Past Surgical History:   Procedure Laterality Date   • INGUINAL HERNIA REPAIR     • VASECTOMY        Family History:     Family History   Problem Relation Age of Onset   • Diabetes Father    • Colon cancer Sister    • Sleep apnea Brother    • Sleep apnea Brother       Social History:     Social History     Socioeconomic History   • Marital status: /Civil Union     Spouse name: None   • Number of children: None   • Years of education: None   • Highest education level: None   Occupational History   • None   Tobacco Use   • Smoking status: Never   • Smokeless tobacco: Never   Vaping Use   • Vaping Use: Never used Substance and Sexual Activity   • Alcohol use: Yes     Comment: occasionally   • Drug use: No   • Sexual activity: None   Other Topics Concern   • None   Social History Narrative   • None     Social Determinants of Health     Financial Resource Strain: Not on file   Food Insecurity: Not on file   Transportation Needs: Not on file   Physical Activity: Not on file   Stress: Not on file   Social Connections: Not on file   Intimate Partner Violence: Not on file   Housing Stability: Not on file      Current Medications:     Current Outpatient Medications   Medication Sig Dispense Refill   • fluticasone (FLONASE) 50 mcg/act nasal spray SPRAY 2 SPRAYS INTO EACH NOSTRIL EVERY DAY 48 mL 1     No current facility-administered medications for this visit  Allergies:     No Known Allergies   Physical Exam:     /86 (BP Location: Left arm, Patient Position: Sitting, Cuff Size: Standard)   Pulse 83   Temp (!) 96 2 °F (35 7 °C) (Tympanic)   Ht 6' (1 829 m)   Wt 95 kg (209 lb 6 4 oz)   SpO2 97%   BMI 28 40 kg/m²     Physical Exam  Constitutional:       Appearance: He is well-developed  HENT:      Head: Normocephalic  Right Ear: External ear normal       Left Ear: External ear normal       Nose: Nose normal    Eyes:      Conjunctiva/sclera: Conjunctivae normal       Pupils: Pupils are equal, round, and reactive to light  Neck:      Thyroid: No thyromegaly  Cardiovascular:      Rate and Rhythm: Normal rate and regular rhythm  Heart sounds: Normal heart sounds  Pulmonary:      Effort: Pulmonary effort is normal       Breath sounds: Normal breath sounds  Abdominal:      General: Bowel sounds are normal       Palpations: Abdomen is soft  Musculoskeletal:         General: Normal range of motion  Cervical back: Normal range of motion  Skin:     General: Skin is warm and dry  Neurological:      Mental Status: He is alert and oriented to person, place, and time     Psychiatric: Behavior: Behavior normal           DO Viola Chauhan [address]

## 2023-03-14 DIAGNOSIS — J30.1 SEASONAL ALLERGIC RHINITIS DUE TO POLLEN: ICD-10-CM

## 2023-03-14 RX ORDER — FLUTICASONE PROPIONATE 50 MCG
SPRAY, SUSPENSION (ML) NASAL
Qty: 48 ML | Refills: 1 | Status: SHIPPED | OUTPATIENT
Start: 2023-03-14

## 2023-03-20 DIAGNOSIS — R42 VERTIGO: Primary | ICD-10-CM

## 2023-03-20 RX ORDER — MECLIZINE HYDROCHLORIDE 25 MG/1
25 TABLET ORAL 3 TIMES DAILY PRN
Qty: 30 TABLET | Refills: 0 | Status: SHIPPED | OUTPATIENT
Start: 2023-03-20

## 2023-03-23 ENCOUNTER — DOCUMENTATION (OUTPATIENT)
Dept: FAMILY MEDICINE CLINIC | Facility: CLINIC | Age: 48
End: 2023-03-23

## 2023-03-23 ENCOUNTER — OFFICE VISIT (OUTPATIENT)
Dept: FAMILY MEDICINE CLINIC | Facility: CLINIC | Age: 48
End: 2023-03-23

## 2023-03-23 VITALS
TEMPERATURE: 97.3 F | HEIGHT: 72 IN | WEIGHT: 212.2 LBS | HEART RATE: 73 BPM | DIASTOLIC BLOOD PRESSURE: 86 MMHG | SYSTOLIC BLOOD PRESSURE: 118 MMHG | BODY MASS INDEX: 28.74 KG/M2 | OXYGEN SATURATION: 97 %

## 2023-03-23 DIAGNOSIS — H81.12 BENIGN PAROXYSMAL POSITIONAL VERTIGO OF LEFT EAR: Primary | ICD-10-CM

## 2023-03-23 DIAGNOSIS — R42 VERTIGO: Primary | ICD-10-CM

## 2023-03-23 RX ORDER — METHYLPREDNISOLONE 4 MG/1
TABLET ORAL
Qty: 21 TABLET | Refills: 0 | Status: SHIPPED | OUTPATIENT
Start: 2023-03-23 | End: 2023-03-23 | Stop reason: ALTCHOICE

## 2023-03-23 NOTE — PROGRESS NOTES
Name: Bossman Peacock      : 1975      MRN: [id number]  Encounter Provider: ELSI Hallman  Encounter Date: 3/23/2023   Encounter department: [place] [address]  Benign paroxysmal positional vertigo of left ear  Assessment & Plan:  Orthostatics completed in office, were within normal limits  Discussed BPPV with patient and use of Meclizine is usually not effective in treating symptoms  Advised to avoid quick changes in position, increase hydration, will refer to PT  Discussed Epley Maneuver with patient  Advised patient to seek immediate care for worsening/concerning symptoms  Orders:  -     Ambulatory Referral to Physical Therapy; Future         Subjective      Patient says the office for evaluation of dizziness  As per patient, symptoms began 3 days ago  Patient states he contacted his PCP, who prescribed him meclizine  Patient states he has been taking the medication, but has not been effective  Patient notes his dizziness as "room spinning "  Dizziness is worse with changes in position and when head is turned to the left  Patient denies recent URI, ear pain  Patient denies headaches, visual disturbances, unilateral weakness, slurred speech  Patient denies chest pain, palpitations, shortness of breath, syncope  Review of Systems   Constitutional: Negative for chills, fatigue and fever  HENT: Negative for congestion, ear pain, hearing loss, sinus pressure, sinus pain and tinnitus  Eyes: Negative for photophobia and visual disturbance  Respiratory: Negative for cough, chest tightness and shortness of breath  Cardiovascular: Negative for chest pain and palpitations  Gastrointestinal: Negative for abdominal pain, nausea and vomiting  Musculoskeletal: Negative for arthralgias, back pain and myalgias  Skin: Negative for color change and rash  Neurological: Positive for dizziness   Negative for syncope, speech difficulty, weakness, light-headedness, numbness and headaches  Hematological: Negative for adenopathy  Psychiatric/Behavioral: Negative for confusion  Current Outpatient Medications on File Prior to Visit   Medication Sig   • fluticasone (FLONASE) 50 mcg/act nasal spray SPRAY 2 SPRAYS INTO EACH NOSTRIL EVERY DAY   • meclizine (ANTIVERT) 25 mg tablet Take 1 tablet (25 mg total) by mouth 3 (three) times a day as needed for dizziness       Objective     /86 (BP Location: Left arm, Patient Position: Standing, Cuff Size: Standard)   Pulse 73   Temp (!) 97 3 °F (36 3 °C) (Tympanic)   Ht 6' (1 829 m)   Wt 96 3 kg (212 lb 3 2 oz)   SpO2 97%   BMI 28 78 kg/m²     Physical Exam  Vitals reviewed  Constitutional:       General: He is not in acute distress  Appearance: Normal appearance  He is not ill-appearing  HENT:      Head: Normocephalic and atraumatic  Right Ear: Tympanic membrane, ear canal and external ear normal  There is no impacted cerumen  Left Ear: Tympanic membrane, ear canal and external ear normal  There is no impacted cerumen  Nose: Nose normal       Mouth/Throat:      Mouth: Mucous membranes are moist       Pharynx: Oropharynx is clear  Eyes:      General: No visual field deficit  Conjunctiva/sclera: Conjunctivae normal       Pupils: Pupils are equal, round, and reactive to light  Cardiovascular:      Rate and Rhythm: Normal rate and regular rhythm  Pulses: Normal pulses  Heart sounds: Normal heart sounds  No murmur heard  Pulmonary:      Effort: Pulmonary effort is normal       Breath sounds: Normal breath sounds  Abdominal:      General: Bowel sounds are normal       Palpations: Abdomen is soft  Musculoskeletal:         General: Normal range of motion  Cervical back: Normal range of motion and neck supple  Right lower leg: No edema  Left lower leg: No edema  Lymphadenopathy:      Cervical: No cervical adenopathy  Skin:     General: Skin is warm and dry  Capillary Refill: Capillary refill takes less than 2 seconds  Neurological:      General: No focal deficit present  Mental Status: He is alert and oriented to person, place, and time  GCS: GCS eye subscore is 4  GCS verbal subscore is 5  GCS motor subscore is 6  Cranial Nerves: Cranial nerves 2-12 are intact  No facial asymmetry  Sensory: Sensation is intact  Motor: Motor function is intact  Coordination: Coordination is intact  Gait: Gait is intact     Psychiatric:         Mood and Affect: Mood normal          Behavior: Behavior normal        ELSI Hooper

## 2023-03-23 NOTE — PATIENT INSTRUCTIONS
Benign Paroxysmal Positional Vertigo   AMBULATORY CARE:   Benign paroxysmal positional vertigo (BPPV)  is an inner ear condition that causes you to suddenly feel dizzy  Benign means it is not serious or life-threatening  BPPV is caused by a problem with the nerves and structure of your inner ear  BPPV happens when small pieces of calcium break loose and lump together in one of your inner ear canals  Common symptoms include the following: You may feel that you or the room is moving or spinning  Turning your head, rolling over in bed, getting up or lying down may lead to sudden vertigo  You may also have any of the following symptoms:  Nystagmus (quick shaky eye movement that you cannot control)    Nausea    Poor balance and feeling unsteady when you walk    Seek care immediately if:   You fall during a BPPV episode and are injured  You have a severe headache that does not go away  You have new changes in your vision or feel weak or confused  You have problems hearing, or you have ringing or buzzing in your ears  Contact your healthcare provider if:   Your BPPV symptoms do not go away or they return  You have problems with your balance, or you are falling often  You have new or increased nausea or vomiting with vertigo  You feel anxious or depressed and do not want to leave your home  You have questions or concerns about your condition or care  Management of BPPV:   Your healthcare provider will teach you how to move your head and body to prevent symptoms  For example, he or she may teach you certain ways to move your head or body  These movements usually help relieve your symptoms and keep the dizziness from returning  The exercises help move the calcium pieces to a different part of your ear  Do the movements only as directed  Vestibular and balance rehabilitation therapy (VBRT)  is used to teach you exercises to improve your balance and strength   VBRT may help decrease your dizziness and prevent injuries if you are at risk for falls  Medicines  may be recommended or prescribed to treat dizziness or nausea  Prevent your symptoms:   Try to avoid sudden head movements  Stand up and lie down slowly  Raise and support your head when you lie down  Place pillows under your upper back and head or rest in a recliner  Change your position often when you are lying down  Try not to lie with your head on the same side for long periods of time  Roll over slowly  Wear protective gear  when you ride a bike or play sports  A helmet helps protect your head from injury  Follow up with your healthcare provider as directed: You may need to return in 1 month to check the progress of your treatment  Write down your questions so you remember to ask them during your visits  © Copyright [employer] 2022 Information is for End User's use only and may not be sold, redistributed or otherwise used for commercial purposes  The above information is an  only  It is not intended as medical advice for individual conditions or treatments  Talk to your doctor, nurse or pharmacist before following any medical regimen to see if it is safe and effective for you

## 2023-03-23 NOTE — ASSESSMENT & PLAN NOTE
Orthostatics completed in office, were within normal limits  Discussed BPPV with patient and use of Meclizine is usually not effective in treating symptoms  Advised to avoid quick changes in position, increase hydration, will refer to PT  Discussed Epley Maneuver with patient  Advised patient to seek immediate care for worsening/concerning symptoms

## 2023-04-24 ENCOUNTER — OFFICE VISIT (OUTPATIENT)
Age: 48
End: 2023-04-24

## 2023-04-24 DIAGNOSIS — R26.89 BALANCE DISORDER: ICD-10-CM

## 2023-04-24 DIAGNOSIS — R42 DIZZINESS: Primary | ICD-10-CM

## 2023-04-24 NOTE — PROGRESS NOTES
Daily Note     Today's date: 2023  Patient name: Aishwarya Chowdhury  : 1975  MRN: [id number]  Referring provider: ELSI Sandhu  Dx:   Encounter Diagnosis     ICD-10-CM    1  Dizziness  R42       2  Balance disorder  R26 89                    Subjective: Pt reports no difficulty with HPE, most challenging is tandem stance EC  Objective: See treatment diary below    NMR  - FT  x1 viewing, plain surround: 2 minutes    H:  - FT  x1 viewing, plain surround: 2 minutes    H: 4/10   V: 3/10  - foam VOR cx: 10 reps   CW: 2/10   CCW: 2/10  - FTEC foam pad with head turns: 30 reps    H: 3/10   V: 2/10    - waking with ball toss with looking right followed by left with toss 50 feet 2 times   - walking with EC with head turns 25 feet x 4 times   - Foam beam tapping blaze pods on high low mat tables and one on each side of foam beam 1 minute: Hit count 40   - Foam beam with 180 deg turns tapping blaze pods on high low mat table 1 minute: Hit count 17    Assessment: Pt tolerated treatment well  Inc dizziness/unsteadiness with dynamic activity with narrow base of support and eyes closed  Average rating 2-3/10  Stressed importance of VOR x 1/ X-1 exercises with walking for 2 minutes  Will cont to progress as able  Patient would benefit from continued PT to maximize functional mobility and return to PLOF  Plan: Continue per plan of care  POC expires Auth Status Total   Visits  Start date  Expiration date PT/OT + Visit Limit?  Co-Insurance   2023 approved 52 pcy 2023 Yes 52 pcy total Yes                                           Visit/Unit Tracking  AUTH Status: approved Date             Visits  Authed: 52 Used 1 2 3             Remaining  [phone]

## 2023-05-01 ENCOUNTER — OFFICE VISIT (OUTPATIENT)
Age: 48
End: 2023-05-01

## 2023-05-01 DIAGNOSIS — R42 DIZZINESS: Primary | ICD-10-CM

## 2023-05-01 DIAGNOSIS — R26.89 BALANCE DISORDER: ICD-10-CM

## 2023-05-01 NOTE — PROGRESS NOTES
Daily Note     Today's date: 2023  Patient name: Salomón Newberry  : 1975  MRN: [id number]  Referring provider: Fani Bae  Dx:   No diagnosis found  Subjective: Pt reports no difficulty with HEP and only difficulty with EC exercises  Objective: See treatment diary below    NMR  - FT  x1 viewing, mirror   - foam VOR cx: 10 reps   CW: 2/10   CCW: 2/10  - ambulating bow tie walks 3 laps ea  - FTEC foam pad with head turns EC: 30 reps    H: 3/10   V: 2/10    - waking with ball toss with looking right followed by left with toss 50 feet 2 times   - 360 ball toss   - walking with EC with head turns 25 feet x 4 times   - cone spiral 30s x 2   - tandem EC 10 steps fwd/bwd x 2  - Tandem stance foam EO HT H/V 30x ea  - tandem EC foam 30s x2  - foam beam fwd/lat head turns 3 laps ea    Assessment: Pt tolerated treatment well  Noted inc dizziness and difficulty with EC exercises  Pt to cont HEP and education on benefits on consistent compliance, progressions, and pt agreeable  Will cont to progress as able  Patient would benefit from continued PT to maximize functional mobility and return to PLOF  Plan: Continue per plan of care  POC expires Auth Status Total   Visits  Start date  Expiration date PT/OT + Visit Limit?  Co-Insurance   2023 approved 52 pcy 2023 Yes 52 pcy total Yes                                           Visit/Unit Tracking  AUTH Status: approved Date            Visits  Authed: 52 Used 1 2 3 4            Remaining  [phone]

## 2023-05-08 ENCOUNTER — APPOINTMENT (OUTPATIENT)
Age: 48
End: 2023-05-08
Payer: COMMERCIAL

## 2023-05-15 ENCOUNTER — APPOINTMENT (OUTPATIENT)
Age: 48
End: 2023-05-15
Payer: COMMERCIAL

## 2023-05-22 ENCOUNTER — APPOINTMENT (OUTPATIENT)
Age: 48
End: 2023-05-22
Payer: COMMERCIAL

## 2023-08-22 ENCOUNTER — TELEPHONE (OUTPATIENT)
Age: 48
End: 2023-08-22

## 2023-08-22 NOTE — TELEPHONE ENCOUNTER
Called and spoke to patient  asked him our Saturday screening ,Cardiac issues,Cpap machine  and he is on a Cpap machine  Patient will call back to schedule his screening when he looks at his schedule

## 2023-08-22 NOTE — TELEPHONE ENCOUNTER
Patient needs to be scheduled in [place] on a Saturday for a colonoscopy. Patient did pass os questions and order is in. Please contact patient he is awaiting call to complete scheduling process.

## 2023-08-22 NOTE — TELEPHONE ENCOUNTER
08/22/23  Screened by: Jacquelin Greenberg    Referring Provider Dr. Valencia Camera: There is no height or weight on file to calculate BMI. Has patient been referred for a routine screening Colonoscopy? YES  Is the patient between 43-73 years old? YES      Previous Colonoscopy YES   If yes:    Date: Over 10 years ago    Facility: [facility]    Reason: screening  / Family hx cancer      SCHEDULING STAFF: If the patient is between 45yrs-49yrs, please advise patient to confirm benefits/coverage with their insurance company for a routine screening colonoscopy, some insurance carriers will only cover at [facility] or older. If the patient is over 66years old, please schedule an office visit. Does the patient want to see a Gastroenterologist prior to their procedure OR are they having any GI symptoms? NO    Has the patient been hospitalized or had abdominal surgery in the past 6 months? NO    Does the patient use supplemental oxygen? NO    Does the patient take Coumadin, Lovenox, Plavix, Elliquis, Xarelto, or other blood thinning medication? NO    Has the patient had a stroke, cardiac event, or stent placed in the past year? NO    PASSED OA    SCHEDULING STAFF: If patient answers NO to above questions, then schedule procedure. If patient answers YES to above questions, then schedule office appointment. If patient is between 45yrs - 49yrs, please advise patient that we will have to confirm benefits & coverage with their insurance company for a routine screening colonoscopy.

## 2023-08-25 ENCOUNTER — OFFICE VISIT (OUTPATIENT)
Age: 48
End: 2023-08-25
Payer: COMMERCIAL

## 2023-08-25 VITALS
OXYGEN SATURATION: 97 % | SYSTOLIC BLOOD PRESSURE: 104 MMHG | HEIGHT: 72 IN | TEMPERATURE: 97.7 F | HEART RATE: 81 BPM | DIASTOLIC BLOOD PRESSURE: 80 MMHG | BODY MASS INDEX: 28.69 KG/M2 | WEIGHT: 211.8 LBS

## 2023-08-25 DIAGNOSIS — Z99.89 OSA ON CPAP: Primary | ICD-10-CM

## 2023-08-25 DIAGNOSIS — G47.33 OSA ON CPAP: Primary | ICD-10-CM

## 2023-08-25 PROCEDURE — 99214 OFFICE O/P EST MOD 30 MIN: CPT

## 2023-08-25 NOTE — PROGRESS NOTES
Pulmonary Follow Up Note  bAbi Ruggiero 51 y.o. male MRN: [id number]  8/25/2023    Assessment:    MAU on CPAP  History of severe MAU with an AHI of 35.7 on prior diagnostic sleep study. He had a CPAP study that recommended pressures of 4- 8 cm H2O. Compliance data was reviewed with patient. He is wearing his auto CPAP set from 4-12 cm H2O approximately 6 hours and 34 minutes nightly. AHI with use of his machine was 11.9 . He has minimal mask leakage. His median pressure was 8.7 cm H2O with a maximum pressure of 11.6 cm H2O. He is sleeping well and generally feels well rested during the day. ESS 4 today. Due to his elevated residual AHI despite well fitting mask and him not requiring maximum CPAP pressures, patient would benefit from a BiPAP study. I have placed the order. We will follow-up with patient after the study iscomplete. For now, he will continue CPAP nightly at current settings. Plan:    Diagnoses and all orders for this visit:    MAU on CPAP  -     Cancel: Sleep BIPAP W/ transcutaneous; Future  -     BIPAP Study; Future          Return in about 9 months (around 5/25/2024). After Bipap study complete. History of Present Illness     Chief Complaint:   Chief Complaint   Patient presents with   • Follow-up   • Sleep Apnea       Patient ID: Elizabeth Lance is a 51 y.o. y.o. male has a past medical history of MAU (obstructive sleep apnea) and Temporomandibular joint tender. 8/25/2023  HPI: Abbi Ruggiero is a 51 y.o. male with a past medical history significant for severe MAU on CPAP. He is here today for a follow-up visit to review CPAP compliance. His last visit was in August 2022. Patient reports he is wearing his CPAP nightly and is sleeping well at night. Denies significant daytime sleepiness, but states if he gets the opportunity, he could fall asleep in the afternoon as not. Patient reports his mask is fitting well and has no complaints.   He is concerned, however stating that he noticed his machine reads consistently elevated AHI scores. Review of Systems   Constitutional: Negative for activity change, chills, fever and unexpected weight change. HENT: Negative for congestion, postnasal drip, rhinorrhea, sore throat and trouble swallowing. Respiratory: Negative for cough, chest tightness, shortness of breath and wheezing. Cardiovascular: Negative for chest pain, palpitations and leg swelling. Allergic/Immunologic: Negative. Psychiatric/Behavioral: Positive for sleep disturbance. Historical Information   Past Medical History:   Diagnosis Date   • MAU (obstructive sleep apnea)    • Temporomandibular joint tender     [id number] resolved     Past Surgical History:   Procedure Laterality Date   • INGUINAL HERNIA REPAIR     • VASECTOMY       Family History   Problem Relation Age of Onset   • Diabetes Father    • Colon cancer Sister    • Sleep apnea Brother    • Sleep apnea Brother        Smoking history: He reports that he has never smoked. He has never used smokeless tobacco.    Occupational History: Teacher    Immunization History   Administered Date(s) Administered   • COVID-19 J&J ([employer]) vaccine 0.5 mL 03/16/2021   • COVID-19 MODERNA VACC 0.5 ML IM 10/31/2021   • H1N1, All Formulations 12/05/2009   • INFLUENZA 10/17/2018, 11/02/2019, 10/01/2020   • Influenza, seasonal, injectable 10/11/2017   • Tdap 07/03/2017       Meds/Allergies     Current Outpatient Medications:   •  fluticasone (FLONASE) 50 mcg/act nasal spray, SPRAY 2 SPRAYS INTO EACH NOSTRIL EVERY DAY, Disp: 48 mL, Rfl: 1  •  meclizine (ANTIVERT) 25 mg tablet, Take 1 tablet (25 mg total) by mouth 3 (three) times a day as needed for dizziness, Disp: 30 tablet, Rfl: 0  Allergies: No Known Allergies      Vitals:  Vitals:    08/25/23 0804   BP: 104/80   Pulse: 81   Temp: 97.7 °F (36.5 °C)   SpO2: 97%   Weight: 96.1 kg (211 lb 12.8 oz)   Height: 6' (1.829 m)   Oxygen Therapy  SpO2: 97 %  .   Wt Readings from Last 3 Encounters:   08/25/23 96.1 kg (211 lb 12.8 oz)   03/23/23 96.3 kg (212 lb 3.2 oz)   03/06/23 95 kg (209 lb 6.4 oz)     Body mass index is 28.73 kg/m². Physical Exam  Vitals and nursing note reviewed. Constitutional:       General: He is not in acute distress. Appearance: Normal appearance. He is well-developed. Cardiovascular:      Rate and Rhythm: Normal rate and regular rhythm. Heart sounds: Normal heart sounds. No murmur heard. Pulmonary:      Effort: Pulmonary effort is normal. No respiratory distress. Breath sounds: Normal breath sounds. No decreased breath sounds, wheezing, rhonchi or rales. Musculoskeletal:         General: No swelling. Right lower leg: No edema. Left lower leg: No edema. Psychiatric:         Mood and Affect: Mood and affect normal.         Behavior: Behavior normal. Behavior is cooperative. Labs: I have personally reviewed pertinent lab results. No results found for: "WBC", "HGB", "HCT", "MCV", "PLT"  Lab Results   Component Value Date    CALCIUM 9.0 04/02/2022    K 3.8 04/02/2022    CO2 28 04/02/2022     04/02/2022    BUN 14 04/02/2022    CREATININE 1.09 04/02/2022     No results found for: "IGE"  Lab Results   Component Value Date    ALT 29 04/02/2022    AST 18 04/02/2022    ALKPHOS 49 04/02/2022       Imaging and other studies: No new diagnostic imaging or studies to review for today's visit. ESS: Total score: 4  No results found.

## 2023-08-25 NOTE — ASSESSMENT & PLAN NOTE
History of severe MAU with an AHI of 35.7 on prior diagnostic sleep study. He had a CPAP study that recommended pressures of 4- 8 cm H2O. Compliance data was reviewed with patient. He is wearing his auto CPAP set from 4-12 cm H2O approximately 6 hours and 34 minutes nightly. AHI with use of his machine was 11.9 . He has minimal mask leakage. His median pressure was 8.7 cm H2O with a maximum pressure of 11.6 cm H2O. He is sleeping well and generally feels well rested during the day. ESS 4 today. Due to his elevated residual AHI despite well fitting mask and him not requiring maximum CPAP pressures, patient would benefit from a BiPAP study. I have placed the order. We will follow-up with patient after the study iscomplete. For now, he will continue CPAP nightly at current settings.

## 2023-08-30 ENCOUNTER — TELEPHONE (OUTPATIENT)
Dept: SLEEP CENTER | Facility: CLINIC | Age: 48
End: 2023-08-30

## 2023-09-21 DIAGNOSIS — J30.1 SEASONAL ALLERGIC RHINITIS DUE TO POLLEN: ICD-10-CM

## 2023-09-21 RX ORDER — FLUTICASONE PROPIONATE 50 MCG
2 SPRAY, SUSPENSION (ML) NASAL DAILY
Qty: 48 ML | Refills: 1 | Status: SHIPPED | OUTPATIENT
Start: 2023-09-21

## 2023-09-26 ENCOUNTER — HOSPITAL ENCOUNTER (OUTPATIENT)
Dept: SLEEP CENTER | Facility: CLINIC | Age: 48
Discharge: HOME | End: 2023-09-26
Payer: COMMERCIAL

## 2023-09-26 DIAGNOSIS — G47.33 OSA ON CPAP: ICD-10-CM

## 2023-09-26 PROCEDURE — 95811 POLYSOM 6/>YRS CPAP 4/> PARM: CPT

## 2023-09-26 PROCEDURE — 95811 POLYSOM 6/>YRS CPAP 4/> PARM: CPT | Performed by: INTERNAL MEDICINE

## 2023-09-27 DIAGNOSIS — G47.33 OSA (OBSTRUCTIVE SLEEP APNEA): Primary | ICD-10-CM

## 2023-09-27 NOTE — PROGRESS NOTES
Sleep Study Documentation    Pre-Sleep Study       Sleep testing procedure explained to patient:YES    Patient napped prior to study:NO    Caffeine:Dayshift worker after 12PM.  Caffeine use:YES- tea  6 to 18 ounces    Alcohol:Dayshift workers after 5PM: Alcohol use:NO    Typical day for patient:YES       Study Documentation    Sleep Study Indications: Failure of CPAP therapy with increased AHI. Sleep Study: Treatment   Optimal PAP pressure: n/a   Leak:None  Snore:Eliminated  REM Obtained:yes  Supplemental O2: no    Minimum SaO2 87%  Baseline SaO2 96%  PAP mask tried (list all) [employer] Simplus (medium)   PAP mask choice (final) [employer] Simplus (medium)   PAP mask type:full face  PAP pressure at which snoring was eliminated 12/6cm   Minimum SaO2 at final PAP pressure 95%  Mode of Therapy:BiPAP  ETCO2:No        EKG abnormalities: no     EEG abnormalities: no    Were abnormal behaviors in sleep observed:NO    Is Total Sleep Study Recording Time < 2 hours: N/A    Is Total Sleep Study Recording Time > 2 hours but study is incomplete: N/A    Is Total Sleep Study Recording Time 6 hours or more but sleep was not obtained: NO    Patient classification: employed       Post-Sleep Study    Medication used at bedtime or during sleep study:YES other prescription medications    Patient reports time it took to fall asleep:less than 20 minutes    Patient reports waking up during study:3 or more times. Patient reports returning to sleep without difficulty. Patient reports sleeping 4 to 6 hours without dreaming. Does the Patient feel this is a typical night of sleep:typical    Patient rated sleepiness: Not sleepy or tired    PAP treatment:yes: Post PAP treatment patient reports feeling unchanged and would wear PAP mask at home.

## 2023-10-06 ENCOUNTER — OFFICE VISIT (OUTPATIENT)
Age: 48
End: 2023-10-06
Payer: COMMERCIAL

## 2023-10-06 VITALS
WEIGHT: 213 LBS | HEIGHT: 72 IN | HEART RATE: 64 BPM | OXYGEN SATURATION: 98 % | BODY MASS INDEX: 28.85 KG/M2 | RESPIRATION RATE: 18 BRPM | DIASTOLIC BLOOD PRESSURE: 68 MMHG | SYSTOLIC BLOOD PRESSURE: 109 MMHG

## 2023-10-06 DIAGNOSIS — G47.33 OSA ON CPAP: Primary | ICD-10-CM

## 2023-10-06 LAB
DME PARACHUTE DELIVERY DATE REQUESTED: NORMAL
DME PARACHUTE ITEM DESCRIPTION: NORMAL
DME PARACHUTE ORDER STATUS: NORMAL
DME PARACHUTE SUPPLIER NAME: NORMAL
DME PARACHUTE SUPPLIER PHONE: NORMAL

## 2023-10-06 PROCEDURE — 99213 OFFICE O/P EST LOW 20 MIN: CPT | Performed by: PHYSICIAN ASSISTANT

## 2023-10-06 NOTE — PROGRESS NOTES
Assessment/Plan:   Diagnoses and all orders for this visit:    MAU on CPAP  -     PAP DME Pressure Change    Patient is here today to follow-up on his CPAP. Recent compliance showed a residual AHI around 11 which is above goal of 5. Has had pressures adjusted in the past but felt when the auto pressure was up to 20 it was too uncomfortable for him and he became very bloated. He did have a BiPAP titration study and was put on a BiPAP but he felt this was very uncomfortable and would prefer to stay on the CPAP. He is currently on a CPAP with auto pressure 4-12. Will slightly increase the max pressure to 14 to see if this helps bring the AHI closer to goal.    Overall there is improvement in his sleep and daytime sleepiness with a decrease in the AHI from 37 on the sleep study now to around 11. We will check a CPAP compliance in a few weeks to see if the AHI has come down with a slight increase in the max pressure. We will hold off on switching him to a BiPAP for now. He will follow-up with us in about 3 months or sooner if necessary. Return in about 3 months (around 1/6/2024). All questions are answered to the patient's satisfaction and understanding. He verbalizes understanding. He is encouraged to call with any further questions or concerns. Portions of the record may have been created with voice recognition software. Occasional wrong word or "sound a like" substitutions may have occurred due to the inherent limitations of voice recognition software. Read the chart carefully and recognize, using context, where substitutions have occurred.     Electronically Signed by Ran Jain PA-C    ______________________________________________________________________    Chief Complaint:   Chief Complaint   Patient presents with   • Follow-up       Patient ID: Leana Webster is a 51 y.o. y.o. male has a past medical history of MAU (obstructive sleep apnea) and Temporomandibular joint tender. 10/6/2023  Patient presents today for follow-up visit. Patient is a 16-year-old male with past medical history of MAU, hyperlipidemia. He is here today to follow-up on his CPAP and recent titration study. He has been on a CPAP for some time with residual AHI above goal at about 11. He was sent for a titration study and was titrated onto a BiPAP. He did not feel comfortable on the BiPAP during the sleep study and is wondering if we can try to change the pressures on his current CPAP instead of switching onto a BiPAP. Review of Systems   Constitutional: Negative. HENT: Negative. Respiratory: Negative. Cardiovascular: Negative. Gastrointestinal: Negative. Genitourinary: Negative. Musculoskeletal: Negative. Skin: Negative. Allergic/Immunologic: Negative. Neurological: Negative. Psychiatric/Behavioral: Negative. Smoking history: He reports that he has never smoked. He has never used smokeless tobacco.    The following portions of the patient's history were reviewed and updated as appropriate: allergies, current medications, past family history, past medical history, past social history, past surgical history and problem list.    Immunization History   Administered Date(s) Administered   • COVID-19 J&J ([employer]) vaccine 0.5 mL 03/16/2021   • COVID-19 MODERNA VACC 0.5 ML IM 10/31/2021   • H1N1, All Formulations 12/05/2009   • INFLUENZA 10/17/2018, 11/02/2019, 10/01/2020   • Influenza, seasonal, injectable 10/11/2017   • Tdap 07/03/2017     Current Outpatient Medications   Medication Sig Dispense Refill   • fluticasone (FLONASE) 50 mcg/act nasal spray 2 sprays into each nostril daily 48 mL 1   • meclizine (ANTIVERT) 25 mg tablet Take 1 tablet (25 mg total) by mouth 3 (three) times a day as needed for dizziness 30 tablet 0     No current facility-administered medications for this visit. Allergies: Patient has no known allergies.     Objective:  Vitals: 10/06/23 1502 10/06/23 1503   BP: 109/68    BP Location: Left arm    Patient Position: Sitting    Cuff Size: Large    Pulse: 64    Resp: 18    SpO2: 98% 98%   Weight: 96.6 kg (213 lb)    Height: 6' (1.829 m)    Oxygen Therapy  SpO2: 98 %  Oxygen Therapy: None (Room air)  . Wt Readings from Last 3 Encounters:   10/06/23 96.6 kg (213 lb)   08/25/23 96.1 kg (211 lb 12.8 oz)   03/23/23 96.3 kg (212 lb 3.2 oz)     Body mass index is 28.89 kg/m². Physical Exam    Lab Review:   Lab Results   Component Value Date    K 3.8 04/02/2022     04/02/2022    CO2 28 04/02/2022    BUN 14 04/02/2022    CREATININE 1.09 04/02/2022    CALCIUM 9.0 04/02/2022     No results found for: "WBC", "HGB", "HCT", "MCV", "PLT"    Diagnostics:  I have personally reviewed pertinent reports. Reviewed CPAP compliance and BiPAP titration study  Office Spirometry Results:     ESS:    No results found.

## 2023-10-23 ENCOUNTER — TELEPHONE (OUTPATIENT)
Age: 48
End: 2023-10-23

## 2023-10-23 ENCOUNTER — TELEPHONE (OUTPATIENT)
Dept: PULMONOLOGY | Facility: CLINIC | Age: 48
End: 2023-10-23

## 2023-10-23 NOTE — TELEPHONE ENCOUNTER
Pressure changes have been faxed to [facility] per patient request [phone] and [phone].

## 2023-10-23 NOTE — TELEPHONE ENCOUNTER
Patient is calling again, his pressure settings were sent in but were sent to [address] [place] [address] and not [facility]. He is asking if we can please send this script to [facility] as soon as possible and return his call once completed. I had documented and forwarded my message in the order last week but does not seem it got addressed or routed properly. Please advise asap.

## 2023-10-23 NOTE — TELEPHONE ENCOUNTER
Called pt to schedule January fu as pt was on recall list   pt states he has been in communication with office staff and still waiting for a call from [facility] with nothing yet    he is very frustrated with the whole situation and would like his pressure changed and an appt made for February after his basketball season   I did schedule a February recall for pt   please follow up with [facility] and their lack of calling pt

## 2023-12-28 ENCOUNTER — TELEPHONE (OUTPATIENT)
Dept: FAMILY MEDICINE CLINIC | Facility: CLINIC | Age: 48
End: 2023-12-28

## 2023-12-28 NOTE — TELEPHONE ENCOUNTER
Returned message - spoke pt - pt will need an appt -  he tested negative for Covid - Pt requesting an appt with provider - and covid swab -  1581/1619 no openings for the rest of day. Pt aware it is best for a patient to be seen and evaluated and not treated without a visit. If there are no openings, pt can be seen at an urgent care. Pt advised just walk into the location nearest him. [facility] offers extended hours, making this an ideal choice for fast, convenient urgent care when your primary care doctor can't see him immediately.   Pt expressed verbal understanding and will do so.         Transcribed VM :   Good afternoon. My name is Jagjit Guerreroty. I'm a patient of Doctor Mancia, [phone]. Again, Jagjit Ramírez [phone]. To family members have tested positive for COVID. I'm pretty sure I have it. I was wondering if I could get in to see Doctor Jerez or be or whatever it is in either office doesn't matter to me in [place] and tried to get a COVID test and hopefully paxlovid - So I would appreciate a phone call. [phone] again for COVID and or flu like symptoms for me just within the last 24 hours and I would appreciate a phone call back to figure out how to proceed. Thank you very much. Have a good day. Kavon.

## 2023-12-29 ENCOUNTER — OFFICE VISIT (OUTPATIENT)
Dept: URGENT CARE | Facility: CLINIC | Age: 48
End: 2023-12-29
Payer: COMMERCIAL

## 2023-12-29 VITALS
DIASTOLIC BLOOD PRESSURE: 81 MMHG | RESPIRATION RATE: 18 BRPM | SYSTOLIC BLOOD PRESSURE: 126 MMHG | OXYGEN SATURATION: 96 % | HEART RATE: 85 BPM | TEMPERATURE: 98.6 F

## 2023-12-29 DIAGNOSIS — R52 BODY ACHES: Primary | ICD-10-CM

## 2023-12-29 DIAGNOSIS — J06.9 ACUTE URI: ICD-10-CM

## 2023-12-29 LAB
SARS-COV-2 AG UPPER RESP QL IA: NEGATIVE
VALID CONTROL: NORMAL

## 2023-12-29 PROCEDURE — 99213 OFFICE O/P EST LOW 20 MIN: CPT | Performed by: PHYSICAL MEDICINE & REHABILITATION

## 2023-12-29 PROCEDURE — 87811 SARS-COV-2 COVID19 W/OPTIC: CPT | Performed by: PHYSICAL MEDICINE & REHABILITATION

## 2023-12-29 RX ORDER — IBUPROFEN 200 MG
TABLET ORAL EVERY 6 HOURS PRN
COMMUNITY

## 2023-12-29 NOTE — PROGRESS NOTES
[facility]        NAME: Jagjit Elmore is a 48 y.o. male  : 1975    MRN: [id number]  DATE: 2023  TIME: 8:27 AM    Assessment and Plan   Body aches [R52]  1. Body aches  Poct Covid 19 Rapid Antigen Test      2. Acute URI            Rapid COVID negative     Patient Instructions     May take over the counter decongestants, cough suppressants as needed.  Follow up with PCP in 3-5 days.  Proceed to  ER if symptoms worsen.    Chief Complaint     Chief Complaint   Patient presents with    Generalized Body Aches     Body aches, post nasal drip, headache since yesterday. Son and wife just tested positive for covid.          History of Present Illness       Patient presenting with body-aches, post-nasal drip, headache that started yesterday. Son and wife are COVID positive. Patient took some advil.    Generalized Body Aches  Associated symptoms include congestion, headaches, fatigue and coughing. Pertinent negatives include no ear pain, rhinorrhea, sore throat, fever, chest pain, shortness of breath, abdominal pain, diarrhea, nausea or vomiting.       Review of Systems   Review of Systems   Constitutional:  Positive for fatigue. Negative for chills and fever.   HENT:  Positive for congestion and postnasal drip. Negative for ear pain, rhinorrhea and sore throat.    Respiratory:  Positive for cough. Negative for shortness of breath.    Cardiovascular: Negative.  Negative for chest pain.   Gastrointestinal: Negative.  Negative for abdominal pain, diarrhea, nausea and vomiting.   Musculoskeletal:  Positive for myalgias.   Neurological:  Positive for headaches.         Current Medications       Current Outpatient Medications:     fluticasone (FLONASE) 50 mcg/act nasal spray, 2 sprays into each nostril daily, Disp: 48 mL, Rfl: 1    ibuprofen (MOTRIN) 200 mg tablet, Take by mouth every 6 (six) hours as needed for mild pain, Disp: , Rfl:     meclizine (ANTIVERT) 25 mg tablet, Take 1 tablet (25 mg  total) by mouth 3 (three) times a day as needed for dizziness (Patient not taking: Reported on 12/29/2023), Disp: 30 tablet, Rfl: 0    Current Allergies     Allergies as of 12/29/2023    (No Known Allergies)            The following portions of the patient's history were reviewed and updated as appropriate: allergies, current medications, past family history, past medical history, past social history, past surgical history and problem list.     Past Medical History:   Diagnosis Date    MAU (obstructive sleep apnea)     Temporomandibular joint tender     [id number] resolved       Past Surgical History:   Procedure Laterality Date    INGUINAL HERNIA REPAIR      VASECTOMY         Family History   Problem Relation Age of Onset    Diabetes Father     Colon cancer Sister     Sleep apnea Brother     Sleep apnea Brother          Medications have been verified.        Objective   /81   Pulse 85   Temp 98.6 °F (37 °C)   Resp 18   SpO2 96%        Physical Exam     Physical Exam  Constitutional:       General: He is not in acute distress.     Appearance: He is ill-appearing.   HENT:      Right Ear: Tympanic membrane normal.      Left Ear: Tympanic membrane normal.      Nose: Congestion present. No rhinorrhea.      Mouth/Throat:      Mouth: Mucous membranes are moist.      Pharynx: Oropharynx is clear. No oropharyngeal exudate or posterior oropharyngeal erythema.   Eyes:      Conjunctiva/sclera: Conjunctivae normal.   Cardiovascular:      Rate and Rhythm: Normal rate and regular rhythm.      Heart sounds: Normal heart sounds.   Pulmonary:      Effort: Pulmonary effort is normal. No respiratory distress.      Breath sounds: Normal breath sounds. No wheezing, rhonchi or rales.   Musculoskeletal:      Cervical back: Normal range of motion and neck supple.   Lymphadenopathy:      Cervical: Cervical adenopathy present.   Skin:     General: Skin is warm.   Neurological:      Mental Status: He is alert.   Psychiatric:          Mood and Affect: Mood normal.         Behavior: Behavior normal.

## 2023-12-29 NOTE — PATIENT INSTRUCTIONS
Please follow up with your PCP within 3-5 days   May use over the counter decongestants, cough suppressants, NSAIDs as needed   You will need to isolate yourself for 5 days followed by 5 more days of wearing your mask. Practice social distancing  Cover your mouth when you cough or sneeze   Wash your hands with soap and water for at least 30 seconds starting with scrubbing the fingers, hands then wrist. Pat dry with towel.   If symptoms worsen or shortness of breath develops, proceed to the ER.

## 2024-01-09 DIAGNOSIS — J30.1 SEASONAL ALLERGIC RHINITIS DUE TO POLLEN: ICD-10-CM

## 2024-01-09 RX ORDER — FLUTICASONE PROPIONATE 50 MCG
2 SPRAY, SUSPENSION (ML) NASAL DAILY
Qty: 48 ML | Refills: 0 | Status: SHIPPED | OUTPATIENT
Start: 2024-01-09

## 2024-03-07 ENCOUNTER — OFFICE VISIT (OUTPATIENT)
Dept: FAMILY MEDICINE CLINIC | Facility: CLINIC | Age: 49
End: 2024-03-07
Payer: COMMERCIAL

## 2024-03-07 VITALS
TEMPERATURE: 97.7 F | HEIGHT: 72 IN | SYSTOLIC BLOOD PRESSURE: 112 MMHG | HEART RATE: 79 BPM | DIASTOLIC BLOOD PRESSURE: 82 MMHG | BODY MASS INDEX: 28.44 KG/M2 | OXYGEN SATURATION: 97 % | WEIGHT: 210 LBS

## 2024-03-07 DIAGNOSIS — J06.9 UPPER RESPIRATORY TRACT INFECTION, UNSPECIFIED TYPE: ICD-10-CM

## 2024-03-07 DIAGNOSIS — R09.81 NASAL CONGESTION: ICD-10-CM

## 2024-03-07 LAB
SARS-COV-2 AG UPPER RESP QL IA: NEGATIVE
SL AMB POCT RAPID FLU A: NEGATIVE
SL AMB POCT RAPID FLU B: NEGATIVE
VALID CONTROL: NORMAL

## 2024-03-07 PROCEDURE — 87804 INFLUENZA ASSAY W/OPTIC: CPT | Performed by: FAMILY MEDICINE

## 2024-03-07 PROCEDURE — 99214 OFFICE O/P EST MOD 30 MIN: CPT | Performed by: FAMILY MEDICINE

## 2024-03-07 PROCEDURE — 87811 SARS-COV-2 COVID19 W/OPTIC: CPT | Performed by: FAMILY MEDICINE

## 2024-03-07 RX ORDER — AZITHROMYCIN 250 MG/1
TABLET, FILM COATED ORAL DAILY
Qty: 6 TABLET | Refills: 0 | Status: SHIPPED | OUTPATIENT
Start: 2024-03-07 | End: 2024-03-12

## 2024-03-07 NOTE — PROGRESS NOTES
Assessment/Plan:    No problem-specific Assessment & Plan notes found for this encounter.       Diagnoses and all orders for this visit:    Nasal congestion  -     POCT Rapid Covid Ag  -     POCT rapid flu A and B    Upper respiratory tract infection, unspecified type  -     azithromycin (Zithromax) 250 mg tablet; Take 2 tablets (500 mg total) by mouth daily for 1 day, THEN 1 tablet (250 mg total) daily for 4 days.        Discussed that likely etiology is viral, patient insistent that he would like ABX as this is his usual regimen from PCP.     Subjective:      Patient ID: Jagjit Elmore is a 48 y.o. male.    HPI      Patient presents to the office for a sick visit. Notes that his son has Flu A, diagnosed on Monday.     Reports that he has been sick for about 4 days, worse within the last 2 days.     States that he has had some chest congestion and green sputum. Reports that he has noticed improvement there since taking the OTC expectorant yesterday.     Denies fever or chills.   Denies body aches or fatigue.   Denies N/V/diarrhea or abdominal pain.   He has had his flu shot this year.     The following portions of the patient's history were reviewed and updated as appropriate: allergies, current medications, past family history, past medical history, past social history, past surgical history, and problem list.    Review of Systems      Objective:  /82   Pulse 79   Temp 97.7 °F (36.5 °C)   Ht 6' (1.829 m)   Wt 95.3 kg (210 lb)   SpO2 97%   BMI 28.48 kg/m²      Physical Exam  Vitals reviewed.   Constitutional:       General: He is not in acute distress.     Appearance: Normal appearance.   HENT:      Head: Normocephalic and atraumatic.      Right Ear: External ear normal.      Left Ear: External ear normal.      Nose: Rhinorrhea present. No congestion.      Mouth/Throat:      Mouth: Mucous membranes are moist.   Eyes:      Extraocular Movements: Extraocular movements intact.      Conjunctiva/sclera:  Conjunctivae normal.   Cardiovascular:      Rate and Rhythm: Normal rate and regular rhythm.      Heart sounds: Normal heart sounds.   Pulmonary:      Breath sounds: Normal breath sounds.   Abdominal:      General: Bowel sounds are normal. There is no distension.      Palpations: Abdomen is soft.      Tenderness: There is no abdominal tenderness.   Musculoskeletal:      Right lower leg: No edema.      Left lower leg: No edema.   Skin:     General: Skin is warm.      Capillary Refill: Capillary refill takes less than 2 seconds.   Neurological:      Mental Status: He is alert. Mental status is at baseline.         DO [facility]  3/7/2024 3:12 PM

## 2024-03-11 ENCOUNTER — OFFICE VISIT (OUTPATIENT)
Dept: FAMILY MEDICINE CLINIC | Facility: CLINIC | Age: 49
End: 2024-03-11
Payer: COMMERCIAL

## 2024-03-11 VITALS
SYSTOLIC BLOOD PRESSURE: 110 MMHG | WEIGHT: 201 LBS | HEIGHT: 72 IN | TEMPERATURE: 97.2 F | OXYGEN SATURATION: 98 % | HEART RATE: 60 BPM | BODY MASS INDEX: 27.22 KG/M2 | DIASTOLIC BLOOD PRESSURE: 80 MMHG | RESPIRATION RATE: 12 BRPM

## 2024-03-11 DIAGNOSIS — E78.2 HYPERLIPIDEMIA, MIXED: ICD-10-CM

## 2024-03-11 DIAGNOSIS — Z00.00 ANNUAL PHYSICAL EXAM: Primary | ICD-10-CM

## 2024-03-11 DIAGNOSIS — G47.33 OSA (OBSTRUCTIVE SLEEP APNEA): ICD-10-CM

## 2024-03-11 PROBLEM — H81.12 BENIGN PAROXYSMAL POSITIONAL VERTIGO OF LEFT EAR: Status: RESOLVED | Noted: 2023-03-23 | Resolved: 2024-03-11

## 2024-03-11 PROCEDURE — 99396 PREV VISIT EST AGE 40-64: CPT | Performed by: FAMILY MEDICINE

## 2024-03-11 NOTE — PROGRESS NOTES
ADULT ANNUAL PHYSICAL  [facility] [address] [facility]    NAME: Jagjit Elmore  AGE: 49 y.o. SEX: male  : 1975     DATE: 3/11/2024     Assessment and Plan:     Problem List Items Addressed This Visit     Hyperlipidemia, mixed    Relevant Orders    Comprehensive metabolic panel    Lipid panel    MAU (obstructive sleep apnea)    Relevant Orders    Ambulatory Referral to Otolaryngology   Other Visit Diagnoses     Annual physical exam    -  Primary    BMI 27.0-27.9,adult                Immunizations and preventive care screenings were discussed with patient today. Appropriate education was printed on patient's after visit summary.        Counseling:  Dental Health: discussed importance of regular tooth brushing, flossing, and dental visits.         Return in 1 year (on 3/11/2025).     Chief Complaint:     Chief Complaint   Patient presents with   • Physical Exam      History of Present Illness:     Adult Annual Physical   Patient here for a comprehensive physical exam. The patient reports no problems.    Diet and Physical Activity  Diet/Nutrition: well balanced diet.   Exercise: moderate cardiovascular exercise.      Depression Screening  PHQ-2/9 Depression Screening    Little interest or pleasure in doing things: 0 - not at all  Feeling down, depressed, or hopeless: 0 - not at all       General Health  Sleep: sleeps well.   Hearing: normal - bilateral.  Vision: no vision problems.   Dental: regular dental visits.        Health  Symptoms include: none    Advanced Care Planning  Do you have an advanced directive? no  Do you have a durable medical power of ? no  ACP document given to patient? no     Review of Systems:     Review of Systems   Constitutional:  Negative for chills, fatigue and fever.   HENT:  Negative for congestion, ear pain, hearing loss, postnasal drip, rhinorrhea and sore throat.    Eyes:  Negative for pain and visual  disturbance.   Respiratory:  Negative for chest tightness, shortness of breath and wheezing.    Cardiovascular:  Negative for chest pain and leg swelling.   Gastrointestinal:  Negative for abdominal distention, abdominal pain, constipation, diarrhea and vomiting.   Endocrine: Negative for cold intolerance and heat intolerance.   Genitourinary:  Negative for difficulty urinating, frequency and urgency.   Musculoskeletal:  Negative for arthralgias and gait problem.   Skin:  Negative for color change.   Neurological:  Negative for dizziness, tremors, syncope, numbness and headaches.   Hematological:  Negative for adenopathy.   Psychiatric/Behavioral:  Negative for agitation, confusion and sleep disturbance. The patient is not nervous/anxious.       Past Medical History:     Past Medical History:   Diagnosis Date   • MAU (obstructive sleep apnea)    • Temporomandibular joint tender     [id number] resolved      Past Surgical History:     Past Surgical History:   Procedure Laterality Date   • INGUINAL HERNIA REPAIR     • VASECTOMY        Family History:     Family History   Problem Relation Age of Onset   • Diabetes Father    • Colon cancer Sister    • Cancer Sister    • Sleep apnea Brother    • Sleep apnea Brother       Social History:     Social History     Socioeconomic History   • Marital status: /Civil Union     Spouse name: None   • Number of children: None   • Years of education: None   • Highest education level: None   Occupational History   • None   Tobacco Use   • Smoking status: Never   • Smokeless tobacco: Never   Vaping Use   • Vaping status: Never Used   Substance and Sexual Activity   • Alcohol use: Not Currently     Comment: occasionally   • Drug use: No   • Sexual activity: Yes     Partners: Female     Birth control/protection: Male Sterilization   Other Topics Concern   • None   Social History Narrative   • None     Social Determinants of Health     Financial Resource Strain: Not on file   Food  Insecurity: Not on file   Transportation Needs: Not on file   Physical Activity: Not on file   Stress: Not on file   Social Connections: Not on file   Intimate Partner Violence: Not on file   Housing Stability: Not on file      Current Medications:     Current Outpatient Medications   Medication Sig Dispense Refill   • fluticasone (FLONASE) 50 mcg/act nasal spray 2 sprays into each nostril daily 48 mL 0   • azithromycin (Zithromax) 250 mg tablet Take 2 tablets (500 mg total) by mouth daily for 1 day, THEN 1 tablet (250 mg total) daily for 4 days. 6 tablet 0   • ibuprofen (MOTRIN) 200 mg tablet Take by mouth every 6 (six) hours as needed for mild pain (Patient not taking: Reported on 3/11/2024)       No current facility-administered medications for this visit.      Allergies:     No Known Allergies   Physical Exam:     /80   Pulse 60   Temp (!) 97.2 °F (36.2 °C)   Resp 12   Ht 6' (1.829 m)   Wt 91.2 kg (201 lb)   SpO2 98%   BMI 27.26 kg/m²     Physical Exam  Constitutional:       Appearance: He is well-developed.   HENT:      Head: Normocephalic.      Right Ear: External ear normal.      Left Ear: External ear normal.      Nose: Nose normal.   Eyes:      Extraocular Movements: Extraocular movements intact.      Conjunctiva/sclera: Conjunctivae normal.      Pupils: Pupils are equal, round, and reactive to light.   Neck:      Thyroid: No thyromegaly.   Cardiovascular:      Rate and Rhythm: Normal rate and regular rhythm.      Heart sounds: Normal heart sounds.   Pulmonary:      Effort: Pulmonary effort is normal.      Breath sounds: Normal breath sounds.   Abdominal:      General: Bowel sounds are normal.      Palpations: Abdomen is soft.   Musculoskeletal:         General: Normal range of motion.      Cervical back: Normal range of motion.   Skin:     General: Skin is warm and dry.   Neurological:      Mental Status: He is alert and oriented to person, place, and time.   Psychiatric:         Mood and  Affect: Mood normal.         Behavior: Behavior normal.          Fabián Roland DO  [facility] [address] [facility]

## 2024-03-16 ENCOUNTER — APPOINTMENT (OUTPATIENT)
Dept: LAB | Facility: HOSPITAL | Age: 49
End: 2024-03-16
Payer: COMMERCIAL

## 2024-03-16 DIAGNOSIS — E78.2 HYPERLIPIDEMIA, MIXED: ICD-10-CM

## 2024-03-16 LAB
ALBUMIN SERPL BCP-MCNC: 4.2 G/DL (ref 3.5–5)
ALP SERPL-CCNC: 44 U/L (ref 34–104)
ALT SERPL W P-5'-P-CCNC: 21 U/L (ref 7–52)
ANION GAP SERPL CALCULATED.3IONS-SCNC: 4 MMOL/L (ref 4–13)
AST SERPL W P-5'-P-CCNC: 16 U/L (ref 13–39)
BILIRUB SERPL-MCNC: 0.49 MG/DL (ref 0.2–1)
BUN SERPL-MCNC: 18 MG/DL (ref 5–25)
CALCIUM SERPL-MCNC: 9.4 MG/DL (ref 8.4–10.2)
CHLORIDE SERPL-SCNC: 107 MMOL/L (ref 96–108)
CHOLEST SERPL-MCNC: 186 MG/DL
CO2 SERPL-SCNC: 30 MMOL/L (ref 21–32)
CREAT SERPL-MCNC: 1.15 MG/DL (ref 0.6–1.3)
GFR SERPL CREATININE-BSD FRML MDRD: 73 ML/MIN/1.73SQ M
GLUCOSE P FAST SERPL-MCNC: 105 MG/DL (ref 65–99)
HDLC SERPL-MCNC: 42 MG/DL
LDLC SERPL CALC-MCNC: 112 MG/DL (ref 0–100)
NONHDLC SERPL-MCNC: 144 MG/DL
POTASSIUM SERPL-SCNC: 3.8 MMOL/L (ref 3.5–5.3)
PROT SERPL-MCNC: 6.7 G/DL (ref 6.4–8.4)
SODIUM SERPL-SCNC: 141 MMOL/L (ref 135–147)
TRIGL SERPL-MCNC: 161 MG/DL

## 2024-03-16 PROCEDURE — 36415 COLL VENOUS BLD VENIPUNCTURE: CPT

## 2024-03-16 PROCEDURE — 80061 LIPID PANEL: CPT

## 2024-03-16 PROCEDURE — 80053 COMPREHEN METABOLIC PANEL: CPT

## 2024-03-18 ENCOUNTER — OFFICE VISIT (OUTPATIENT)
Age: 49
End: 2024-03-18
Payer: COMMERCIAL

## 2024-03-18 VITALS
OXYGEN SATURATION: 97 % | RESPIRATION RATE: 16 BRPM | WEIGHT: 212 LBS | BODY MASS INDEX: 28.71 KG/M2 | HEIGHT: 72 IN | DIASTOLIC BLOOD PRESSURE: 75 MMHG | SYSTOLIC BLOOD PRESSURE: 127 MMHG | HEART RATE: 86 BPM

## 2024-03-18 DIAGNOSIS — G47.33 OSA ON CPAP: Primary | ICD-10-CM

## 2024-03-18 PROCEDURE — 99213 OFFICE O/P EST LOW 20 MIN: CPT | Performed by: PHYSICIAN ASSISTANT

## 2024-03-18 NOTE — PROGRESS NOTES
"Assessment/Plan:   Diagnoses and all orders for this visit:    MAU on CPAP        Patient is here today for follow up. He continues with his CPAP on a nightly basis.  Reviewed compliance which again shows a residual AHI around 11.  Mask leak about 16 minutes.  He had previously undergone titration study and was titrated onto a BiPAP.  We tried adjusting his CPAP pressures to see if this would help bring the AHI back to goal but does not seem to be effective.  He did not sleep well at all with the BiPAP during the titration study and does not want to switch to a BiPAP.  He feels that he sleeps well with the CPAP, does note improvement in his sleep feels better during the day when he uses the CPAP.  AHI did come down from 38 on his sleep study so there is some improvement with the CPAP.    His PCP did refer him to ENT to discuss possible Inspire as an alternative treatment.   We will keep him on the CPAP for now until his ENT evaluation.    He will follow-up with us in about 6 months or sooner if necessary.    Return in about 6 months (around 9/18/2024).  All questions are answered to the patient's satisfaction and understanding.  He verbalizes understanding.  He is encouraged to call with any further questions or concerns.    Portions of the record may have been created with voice recognition software.  Occasional wrong word or \"sound a like\" substitutions may have occurred due to the inherent limitations of voice recognition software.  Read the chart carefully and recognize, using context, where substitutions have occurred.    Electronically Signed by Rishabh Craft PA-C    ______________________________________________________________________    Chief Complaint: No chief complaint on file.      Patient ID: Jagjit is a 49 y.o. y.o. male has a past medical history of MAU (obstructive sleep apnea) and Temporomandibular joint tender.    3/18/2024  Patient presents today for follow-up visit.  Patient is a 49-year-old " male with past medical history of MAU, hyperlipidemia.    He is here today for follow-up.  Continues with his CPAP on a nightly basis, feels he is tolerating it, does note improvement with his sleep on the CPAP.      primary symptoms  Pertinent negatives include no chest pain, fever, headaches, myalgias or sore throat.       Review of Systems   Constitutional:  Negative for appetite change and fever.   HENT:  Negative for ear pain, postnasal drip, rhinorrhea, sneezing, sore throat and trouble swallowing.    Cardiovascular:  Negative for chest pain.   Musculoskeletal:  Negative for myalgias.   Skin: Negative.    Neurological:  Negative for headaches.       Smoking history: He reports that he has never smoked. He has never used smokeless tobacco.    The following portions of the patient's history were reviewed and updated as appropriate: allergies, current medications, past family history, past medical history, past social history, past surgical history, and problem list.    Immunization History   Administered Date(s) Administered    COVID-19 J&J (GoNogging) vaccine 0.5 mL 03/16/2021    COVID-19 MODERNA VACC 0.5 ML IM 10/31/2021    H1N1, All Formulations 12/05/2009    INFLUENZA 10/17/2018, 11/02/2019, 10/01/2020    Influenza, seasonal, injectable 10/11/2017    Tdap 07/03/2017     Current Outpatient Medications   Medication Sig Dispense Refill    fluticasone (FLONASE) 50 mcg/act nasal spray 2 sprays into each nostril daily 48 mL 0    ibuprofen (MOTRIN) 200 mg tablet Take by mouth every 6 (six) hours as needed for mild pain (Patient not taking: Reported on 3/11/2024)       No current facility-administered medications for this visit.     Allergies: Patient has no known allergies.    Objective:  Vitals:    03/18/24 1527 03/18/24 1528   BP: 127/75    BP Location: Right arm    Patient Position: Sitting    Cuff Size: Large    Pulse: 86    Resp: 16    SpO2: 97% 97%   Weight: 96.2 kg (212 lb)    Height: 6' (1.829 m)    Oxygen  "Therapy  SpO2: 97 %  Oxygen Therapy: None (Room air)  .  Wt Readings from Last 3 Encounters:   03/18/24 96.2 kg (212 lb)   03/11/24 91.2 kg (201 lb)   03/07/24 95.3 kg (210 lb)     Body mass index is 28.75 kg/m².    Physical Exam    Lab Review:   Lab Results   Component Value Date    K 3.8 03/16/2024     03/16/2024    CO2 30 03/16/2024    BUN 18 03/16/2024    CREATININE 1.15 03/16/2024    CALCIUM 9.4 03/16/2024     No results found for: \"WBC\", \"HGB\", \"HCT\", \"MCV\", \"PLT\"    Diagnostics:  I have personally reviewed pertinent reports.    Reviewed CPAP compliance  Office Spirometry Results:     ESS:    No results found.  Answers submitted by the patient for this visit:  Pulmonology Questionnaire (Submitted on 3/17/2024)  Chief Complaint: Primary symptoms  Do you have shortness of breath that occurs with effort or exertion?: No  Do you have ear congestion?: No  Do you have heartburn?: No  Do you have fatigue?: No  Do you have nasal congestion?: No  Do you have shortness of breath when lying flat?: No  Do you have shortness of breath when you wake up?: No  Do you have sweats?: No  Have you experienced weight loss?: No  Which of the following makes your symptoms worse?: nothing  Which of the following makes your symptoms better?: nothing    "

## 2024-04-27 DIAGNOSIS — J30.1 SEASONAL ALLERGIC RHINITIS DUE TO POLLEN: ICD-10-CM

## 2024-04-27 RX ORDER — FLUTICASONE PROPIONATE 50 MCG
SPRAY, SUSPENSION (ML) NASAL
Qty: 48 ML | Refills: 0 | Status: SHIPPED | OUTPATIENT
Start: 2024-04-27

## 2024-08-16 ENCOUNTER — APPOINTMENT (OUTPATIENT)
Dept: LAB | Facility: HOSPITAL | Age: 49
End: 2024-08-16
Payer: COMMERCIAL

## 2024-08-16 ENCOUNTER — OFFICE VISIT (OUTPATIENT)
Dept: LAB | Facility: HOSPITAL | Age: 49
End: 2024-08-16
Payer: COMMERCIAL

## 2024-08-16 DIAGNOSIS — G47.33 OSA (OBSTRUCTIVE SLEEP APNEA): ICD-10-CM

## 2024-08-16 DIAGNOSIS — Z01.818 PRE-OPERATIVE EXAMINATION: ICD-10-CM

## 2024-08-16 LAB
ANION GAP SERPL CALCULATED.3IONS-SCNC: 7 MMOL/L (ref 4–13)
ATRIAL RATE: 61 BPM
BASOPHILS # BLD AUTO: 0.02 THOUSANDS/ÂΜL (ref 0–0.1)
BASOPHILS NFR BLD AUTO: 0 % (ref 0–1)
BUN SERPL-MCNC: 14 MG/DL (ref 5–25)
CALCIUM SERPL-MCNC: 9.4 MG/DL (ref 8.4–10.2)
CHLORIDE SERPL-SCNC: 106 MMOL/L (ref 96–108)
CO2 SERPL-SCNC: 28 MMOL/L (ref 21–32)
CREAT SERPL-MCNC: 1.11 MG/DL (ref 0.6–1.3)
EOSINOPHIL # BLD AUTO: 0.04 THOUSAND/ÂΜL (ref 0–0.61)
EOSINOPHIL NFR BLD AUTO: 1 % (ref 0–6)
ERYTHROCYTE [DISTWIDTH] IN BLOOD BY AUTOMATED COUNT: 11.9 % (ref 11.6–15.1)
GFR SERPL CREATININE-BSD FRML MDRD: 77 ML/MIN/1.73SQ M
GLUCOSE P FAST SERPL-MCNC: 103 MG/DL (ref 65–99)
HCT VFR BLD AUTO: 42.3 % (ref 36.5–49.3)
HGB BLD-MCNC: 14.2 G/DL (ref 12–17)
IMM GRANULOCYTES # BLD AUTO: 0.01 THOUSAND/UL (ref 0–0.2)
IMM GRANULOCYTES NFR BLD AUTO: 0 % (ref 0–2)
LYMPHOCYTES # BLD AUTO: 1.63 THOUSANDS/ÂΜL (ref 0.6–4.47)
LYMPHOCYTES NFR BLD AUTO: 34 % (ref 14–44)
MCH RBC QN AUTO: 30.7 PG (ref 26.8–34.3)
MCHC RBC AUTO-ENTMCNC: 33.6 G/DL (ref 31.4–37.4)
MCV RBC AUTO: 91 FL (ref 82–98)
MONOCYTES # BLD AUTO: 0.26 THOUSAND/ÂΜL (ref 0.17–1.22)
MONOCYTES NFR BLD AUTO: 5 % (ref 4–12)
NEUTROPHILS # BLD AUTO: 2.84 THOUSANDS/ÂΜL (ref 1.85–7.62)
NEUTS SEG NFR BLD AUTO: 60 % (ref 43–75)
NRBC BLD AUTO-RTO: 0 /100 WBCS
P AXIS: 1 DEGREES
PLATELET # BLD AUTO: 175 THOUSANDS/UL (ref 149–390)
PMV BLD AUTO: 8.7 FL (ref 8.9–12.7)
POTASSIUM SERPL-SCNC: 3.9 MMOL/L (ref 3.5–5.3)
PR INTERVAL: 160 MS
QRS AXIS: 6 DEGREES
QRSD INTERVAL: 94 MS
QT INTERVAL: 396 MS
QTC INTERVAL: 398 MS
RBC # BLD AUTO: 4.63 MILLION/UL (ref 3.88–5.62)
SODIUM SERPL-SCNC: 141 MMOL/L (ref 135–147)
T WAVE AXIS: 20 DEGREES
VENTRICULAR RATE: 61 BPM
WBC # BLD AUTO: 4.8 THOUSAND/UL (ref 4.31–10.16)

## 2024-08-16 PROCEDURE — 93005 ELECTROCARDIOGRAM TRACING: CPT

## 2024-08-16 PROCEDURE — 93010 ELECTROCARDIOGRAM REPORT: CPT | Performed by: INTERNAL MEDICINE

## 2024-08-16 PROCEDURE — 80048 BASIC METABOLIC PNL TOTAL CA: CPT

## 2024-08-16 PROCEDURE — 85025 COMPLETE CBC W/AUTO DIFF WBC: CPT

## 2024-08-16 PROCEDURE — 36415 COLL VENOUS BLD VENIPUNCTURE: CPT

## 2024-08-23 DIAGNOSIS — J30.1 SEASONAL ALLERGIC RHINITIS DUE TO POLLEN: ICD-10-CM

## 2024-08-23 RX ORDER — FLUTICASONE PROPIONATE 50 MCG
SPRAY, SUSPENSION (ML) NASAL
Qty: 48 ML | Refills: 0 | Status: SHIPPED | OUTPATIENT
Start: 2024-08-23

## 2024-12-23 DIAGNOSIS — J30.1 SEASONAL ALLERGIC RHINITIS DUE TO POLLEN: ICD-10-CM

## 2024-12-24 RX ORDER — FLUTICASONE PROPIONATE 50 MCG
SPRAY, SUSPENSION (ML) NASAL
Qty: 48 ML | Refills: 0 | Status: SHIPPED | OUTPATIENT
Start: 2024-12-24

## 2025-01-13 ENCOUNTER — PREP FOR PROCEDURE (OUTPATIENT)
Age: 50
End: 2025-01-13

## 2025-01-13 ENCOUNTER — TELEPHONE (OUTPATIENT)
Age: 50
End: 2025-01-13

## 2025-01-13 DIAGNOSIS — Z80.0 FAMILY HX OF COLON CANCER: Primary | ICD-10-CM

## 2025-01-13 NOTE — TELEPHONE ENCOUNTER
01/13/25  Screened by: Steph Seo MA    Referring Provider PCP    Pre- Screening:     There is no height or weight on file to calculate BMI.  Has patient been referred for a routine screening Colonoscopy? yes  Is the patient between 45-75 years old? yes      Previous Colonoscopy yes   If yes:    Date: approx 15 years ago    Facility: [facility] DONT REMEMBER LOCATION    Reason: FAMILY HX          Does the patient want to see a Gastroenterologist prior to their procedure OR are they having any GI symptoms? no    Has the patient been hospitalized or had abdominal surgery in the past 6 months? no    Does the patient use supplemental oxygen? no    Does the patient take Coumadin, Lovenox, Plavix, Elliquis, Xarelto, or other blood thinning medication? NO    Has the patient had a stroke, cardiac event, or stent placed in the past year? no        If patient is between 45yrs - 49yrs, please advise patient that we will have to confirm benefits & coverage with their insurance company for a routine screening colonoscopy.

## 2025-01-13 NOTE — TELEPHONE ENCOUNTER
Scheduled date of colonoscopy (as of today): 2/22/2025  Physician performing colonoscopy: DR VELASQUEZ  Location of colonoscopy: [place]  Bowel prep reviewed with patient: ALVARO/SARAH  Instructions reviewed with patient by: Steph via telephone. Procedure directions sent via [facility].  Clearances: n/a

## 2025-02-22 ENCOUNTER — ANESTHESIA (OUTPATIENT)
Dept: GASTROENTEROLOGY | Facility: HOSPITAL | Age: 50
End: 2025-02-22
Payer: COMMERCIAL

## 2025-02-22 ENCOUNTER — HOSPITAL ENCOUNTER (OUTPATIENT)
Dept: GASTROENTEROLOGY | Facility: HOSPITAL | Age: 50
Setting detail: OUTPATIENT SURGERY
Discharge: HOME | End: 2025-02-22
Attending: INTERNAL MEDICINE
Payer: COMMERCIAL

## 2025-02-22 ENCOUNTER — ANESTHESIA EVENT (OUTPATIENT)
Dept: GASTROENTEROLOGY | Facility: HOSPITAL | Age: 50
End: 2025-02-22
Payer: COMMERCIAL

## 2025-02-22 VITALS
DIASTOLIC BLOOD PRESSURE: 84 MMHG | BODY MASS INDEX: 28.28 KG/M2 | OXYGEN SATURATION: 95 % | WEIGHT: 208.78 LBS | RESPIRATION RATE: 19 BRPM | TEMPERATURE: 97.6 F | SYSTOLIC BLOOD PRESSURE: 123 MMHG | HEART RATE: 75 BPM | HEIGHT: 72 IN

## 2025-02-22 DIAGNOSIS — Z12.11 COLON CANCER SCREENING: ICD-10-CM

## 2025-02-22 DIAGNOSIS — Z80.0 FAMILY HX OF COLON CANCER: ICD-10-CM

## 2025-02-22 PROCEDURE — 45385 COLONOSCOPY W/LESION REMOVAL: CPT | Performed by: INTERNAL MEDICINE

## 2025-02-22 PROCEDURE — 88305 TISSUE EXAM BY PATHOLOGIST: CPT | Performed by: PATHOLOGY

## 2025-02-22 RX ORDER — LIDOCAINE HYDROCHLORIDE 10 MG/ML
INJECTION, SOLUTION EPIDURAL; INFILTRATION; INTRACAUDAL; PERINEURAL AS NEEDED
Status: DISCONTINUED | OUTPATIENT
Start: 2025-02-22 | End: 2025-02-22

## 2025-02-22 RX ORDER — PROPOFOL 10 MG/ML
INJECTION, EMULSION INTRAVENOUS AS NEEDED
Status: DISCONTINUED | OUTPATIENT
Start: 2025-02-22 | End: 2025-02-22

## 2025-02-22 RX ORDER — SODIUM CHLORIDE, SODIUM LACTATE, POTASSIUM CHLORIDE, CALCIUM CHLORIDE 600; 310; 30; 20 MG/100ML; MG/100ML; MG/100ML; MG/100ML
50 INJECTION, SOLUTION INTRAVENOUS CONTINUOUS
Status: DISCONTINUED | OUTPATIENT
Start: 2025-02-22 | End: 2025-02-26 | Stop reason: HOSPADM

## 2025-02-22 RX ADMIN — PROPOFOL 140 MCG/KG/MIN: 10 INJECTION, EMULSION INTRAVENOUS at 07:59

## 2025-02-22 RX ADMIN — SODIUM CHLORIDE, SODIUM LACTATE, POTASSIUM CHLORIDE, AND CALCIUM CHLORIDE 50 ML/HR: .6; .31; .03; .02 INJECTION, SOLUTION INTRAVENOUS at 07:17

## 2025-02-22 RX ADMIN — PROPOFOL 100 MG: 10 INJECTION, EMULSION INTRAVENOUS at 07:58

## 2025-02-22 RX ADMIN — LIDOCAINE HYDROCHLORIDE 50 MG: 10 INJECTION, SOLUTION EPIDURAL; INFILTRATION; INTRACAUDAL at 07:58

## 2025-02-22 RX ADMIN — PROPOFOL 20 MG: 10 INJECTION, EMULSION INTRAVENOUS at 08:01

## 2025-02-22 NOTE — H&P
History and Physical -  [facility]  Jagjit Elmore 49 y.o. male MRN: [id number]                  HPI: Jagjit Elmore is a 49 y.o. year old male who presents for colonoscopy for colon cancer screening.      REVIEW OF SYSTEMS: Per the HPI, and otherwise unremarkable.    Historical Information   Past Medical History:   Diagnosis Date    MAU (obstructive sleep apnea)     Temporomandibular joint tender     [id number] resolved     Past Surgical History:   Procedure Laterality Date    INGUINAL HERNIA REPAIR      VASECTOMY       Social History   Social History     Substance and Sexual Activity   Alcohol Use Not Currently    Comment: occasionally     Social History     Substance and Sexual Activity   Drug Use No     Social History     Tobacco Use   Smoking Status Never   Smokeless Tobacco Never     Family History   Problem Relation Age of Onset    Diabetes Father     Colon cancer Sister     Cancer Sister     Sleep apnea Brother     Sleep apnea Brother        Meds/Allergies       Current Outpatient Medications:     fluticasone (FLONASE) 50 mcg/act nasal spray    ibuprofen (MOTRIN) 200 mg tablet    Current Facility-Administered Medications:     lactated ringers infusion, 50 mL/hr, Intravenous, Continuous, 50 mL/hr at 02/22/25 0717    No Known Allergies    Objective     /91   Pulse 74   Temp (!) 97 °F (36.1 °C) (Temporal)   Resp 16   Ht 6' (1.829 m)   Wt 94.7 kg (208 lb 12.4 oz)   SpO2 97%   BMI 28.32 kg/m²       PHYSICAL EXAM    Gen: NAD  Head: NCAT  CV: RRR  CHEST: Clear  ABD: soft, NT/ND  EXT: no edema      ASSESSMENT/PLAN:  Jagjit Elmore is a 49 y.o. year old male who presents for colonoscopy for colon cancer screening. The patient is stable and optimized for the procedure, we reviewed risk and benefits. Risk include but not limited to infection, bleeding, perforation and missing a lesion.

## 2025-02-22 NOTE — ANESTHESIA POSTPROCEDURE EVALUATION
Post-Op Assessment Note    CV Status:  Stable    Pain management: satisfactory to patient       Mental Status:  Sleepy and arousable   Hydration Status:  Euvolemic   PONV Controlled:  Controlled   Airway Patency:  Patent     Post Op Vitals Reviewed: Yes    No anethesia notable event occurred.    Staff: Anesthesiologist, CRNA           Last Filed PACU Vitals:  Vitals Value Taken Time   Temp     Pulse     BP     Resp     SpO2

## 2025-02-22 NOTE — ANESTHESIA PREPROCEDURE EVALUATION
Procedure:  COLONOSCOPY    Relevant Problems   CARDIO   (+) Hyperlipidemia, mixed      PULMONARY   (+) MAU (obstructive sleep apnea)   (+) MAU on CPAP        Physical Exam    Airway    Mallampati score: III  TM Distance: >3 FB  Neck ROM: full     Dental   No notable dental hx     Cardiovascular      Pulmonary      Other Findings        Anesthesia Plan  ASA Score- 2     Anesthesia Type- IV sedation with anesthesia with ASA Monitors.         Additional Monitors:     Airway Plan:            Plan Factors-Exercise tolerance (METS): >4 METS.    Chart reviewed.    Patient summary reviewed.                  Induction- intravenous.    Postoperative Plan-     Perioperative Resuscitation Plan - Level 1 - Full Code.       Informed Consent- Anesthetic plan and risks discussed with patient.  I personally reviewed this patient with the CRNA. Discussed and agreed on the Anesthesia Plan with the CRNA..      NPO Status:  Vitals Value Taken Time   Date of last liquid 02/22/25 02/22/25 0710   Time of last liquid 0645 02/22/25 0710   Date of last solid 02/21/25 02/22/25 0710   Time of last solid 0600 02/22/25 0710

## 2025-02-26 ENCOUNTER — RESULTS FOLLOW-UP (OUTPATIENT)
Dept: GASTROENTEROLOGY | Facility: CLINIC | Age: 50
End: 2025-02-26

## 2025-02-26 PROCEDURE — 88305 TISSUE EXAM BY PATHOLOGIST: CPT | Performed by: PATHOLOGY

## 2025-03-12 ENCOUNTER — OFFICE VISIT (OUTPATIENT)
Dept: FAMILY MEDICINE CLINIC | Facility: CLINIC | Age: 50
End: 2025-03-12
Payer: COMMERCIAL

## 2025-03-12 VITALS
WEIGHT: 212 LBS | HEIGHT: 72 IN | BODY MASS INDEX: 28.71 KG/M2 | DIASTOLIC BLOOD PRESSURE: 82 MMHG | SYSTOLIC BLOOD PRESSURE: 122 MMHG | OXYGEN SATURATION: 97 % | HEART RATE: 87 BPM | RESPIRATION RATE: 18 BRPM

## 2025-03-12 DIAGNOSIS — Z13.6 SCREENING FOR CARDIOVASCULAR CONDITION: ICD-10-CM

## 2025-03-12 DIAGNOSIS — Z00.00 ANNUAL PHYSICAL EXAM: Primary | ICD-10-CM

## 2025-03-12 PROCEDURE — 99396 PREV VISIT EST AGE 40-64: CPT | Performed by: FAMILY MEDICINE

## 2025-03-12 NOTE — PATIENT INSTRUCTIONS
"Patient Education     Routine physical for adults   The Basics   Written by the doctors and editors at [facility]   What is a physical? -- A physical is a routine visit, or \"check-up,\" with your doctor. You might also hear it called a \"wellness visit\" or \"preventive visit.\"  During each visit, the doctor will:   Ask about your physical and mental health   Ask about your habits, behaviors, and lifestyle   Do an exam   Give you vaccines if needed   Talk to you about any medicines you take   Give advice about your health   Answer your questions  Getting regular check-ups is an important part of taking care of your health. It can help your doctor find and treat any problems you have. But it's also important for preventing health problems.  A routine physical is different from a \"sick visit.\" A sick visit is when you see a doctor because of a health concern or problem. Since physicals are scheduled ahead of time, you can think about what you want to ask the doctor.  How often should I get a physical? -- It depends on your age and health. In general, for people age 21 years and older:   If you are younger than 50 years, you might be able to get a physical every 3 years.   If you are 50 years or older, your doctor might recommend a physical every year.  If you have an ongoing health condition, like diabetes or high blood pressure, your doctor will probably want to see you more often.  What happens during a physical? -- In general, each visit will include:   Physical exam - The doctor or nurse will check your height, weight, heart rate, and blood pressure. They will also look at your eyes and ears. They will ask about how you are feeling and whether you have any symptoms that bother you.   Medicines - It's a good idea to bring a list of all the medicines you take to each doctor visit. Your doctor will talk to you about your medicines and answer any questions. Tell them if you are having any side effects that bother you. You " "should also tell them if you are having trouble paying for any of your medicines.   Habits and behaviors - This includes:   Your diet   Your exercise habits   Whether you smoke, drink alcohol, or use drugs   Whether you are sexually active   Whether you feel safe at home  Your doctor will talk to you about things you can do to improve your health and lower your risk of health problems. They will also offer help and support. For example, if you want to quit smoking, they can give you advice and might prescribe medicines. If you want to improve your diet or get more physical activity, they can help you with this, too.   Lab tests, if needed - The tests you get will depend on your age and situation. For example, your doctor might want to check your:   Cholesterol   Blood sugar   Iron level   Vaccines - The recommended vaccines will depend on your age, health, and what vaccines you already had. Vaccines are very important because they can prevent certain serious or deadly infections.   Discussion of screening - \"Screening\" means checking for diseases or other health problems before they cause symptoms. Your doctor can recommend screening based on your age, risk, and preferences. This might include tests to check for:   Cancer, such as breast, prostate, cervical, ovarian, colorectal, prostate, lung, or skin cancer   Sexually transmitted infections, such as chlamydia and gonorrhea   Mental health conditions like depression and anxiety  Your doctor will talk to you about the different types of screening tests. They can help you decide which screenings to have. They can also explain what the results might mean.   Answering questions - The physical is a good time to ask the doctor or nurse questions about your health. If needed, they can refer you to other doctors or specialists, too.  Adults older than 65 years often need other care, too. As you get older, your doctor will talk to you about:   How to prevent falling at " home   Hearing or vision tests   Memory testing   How to take your medicines safely   Making sure that you have the help and support you need at home  All topics are updated as new evidence becomes available and our peer review process is complete.  This topic retrieved from [employer] on: May 02, 2024.  Topic 723726 Version 1.0  Release: 32.4.3 - C32.122  © 2024 UpToDate, Inc. and/or its affiliates. All rights reserved.  Consumer Information Use and Disclaimer   Disclaimer: This generalized information is a limited summary of diagnosis, treatment, and/or medication information. It is not meant to be comprehensive and should be used as a tool to help the user understand and/or assess potential diagnostic and treatment options. It does NOT include all information about conditions, treatments, medications, side effects, or risks that may apply to a specific patient. It is not intended to be medical advice or a substitute for the medical advice, diagnosis, or treatment of a health care provider based on the health care provider's examination and assessment of a patient's specific and unique circumstances. Patients must speak with a health care provider for complete information about their health, medical questions, and treatment options, including any risks or benefits regarding use of medications. This information does not endorse any treatments or medications as safe, effective, or approved for treating a specific patient. UpToDate, Inc. and its affiliates disclaim any warranty or liability relating to this information or the use thereof.The use of this information is governed by the Terms of Use, available at https://www.[employer] ([employer])uwer.com/en/know/clinical-effectiveness-terms. 2024© UpToDate, Inc. and its affiliates and/or licensors. All rights reserved.  Copyright   © 2024 UpToDate, Inc. and/or its affiliates. All rights reserved.

## 2025-03-12 NOTE — PROGRESS NOTES
Adult Annual Physical  Name: Jagjit Elmore      : 1975      MRN: [id number]  Encounter Provider: Fabián Roland DO  Encounter Date: 3/12/2025   Encounter department: [facility] [address]    Assessment & Plan  Annual physical exam         Screening for cardiovascular condition    Orders:  •  Comprehensive metabolic panel; Future  •  Lipid panel; Future      Preventive Screenings:  - Diabetes Screening: screening up-to-date  - Cholesterol Screening: screening not indicated and has hyperlipidemia   - Hepatitis C screening: screening not indicated   - HIV screening: screening not indicated   - Colon cancer screening: screening up-to-date   - Lung cancer screening: screening not indicated   - Prostate cancer screening: screening not indicated     Immunizations:  - Immunizations due: Influenza and Zoster (Shingrix)      Depression Screening and Follow-up Plan: Patient was screened for depression during today's encounter. They screened negative with a PHQ-2 score of 0.          History of Present Illness     Adult Annual Physical:  Patient presents for annual physical.     Diet and Physical Activity:  - Diet/Nutrition: well balanced diet.  - Exercise: no formal exercise.    Depression Screening:  - PHQ-2 Score: 0    General Health:  - Sleep: sleeps well.  - Hearing: normal hearing bilateral ears.  - Vision: no vision problems.  - Dental: regular dental visits.    Review of Systems   Constitutional:  Negative for chills, fatigue and fever.   HENT:  Negative for congestion, ear pain, hearing loss, postnasal drip, rhinorrhea and sore throat.    Eyes:  Negative for pain and visual disturbance.   Respiratory:  Negative for chest tightness, shortness of breath and wheezing.    Cardiovascular:  Negative for chest pain and leg swelling.   Gastrointestinal:  Negative for abdominal distention, abdominal pain, constipation, diarrhea and vomiting.   Endocrine: Negative for cold  intolerance and heat intolerance.   Genitourinary:  Negative for difficulty urinating, frequency and urgency.   Musculoskeletal:  Negative for arthralgias and gait problem.   Skin:  Negative for color change.   Neurological:  Negative for dizziness, tremors, syncope, numbness and headaches.   Hematological:  Negative for adenopathy.   Psychiatric/Behavioral:  Negative for agitation, confusion and sleep disturbance. The patient is not nervous/anxious.          Objective   /82 (BP Location: Left arm, Patient Position: Sitting, Cuff Size: Large)   Pulse 87   Resp 18   Ht 6' (1.829 m)   Wt 96.2 kg (212 lb)   SpO2 97%   BMI 28.75 kg/m²     Physical Exam  Constitutional:       Appearance: He is well-developed.   HENT:      Head: Normocephalic.      Right Ear: External ear normal.      Left Ear: External ear normal.      Nose: Nose normal.   Eyes:      Extraocular Movements: Extraocular movements intact.      Conjunctiva/sclera: Conjunctivae normal.      Pupils: Pupils are equal, round, and reactive to light.   Neck:      Thyroid: No thyromegaly.   Cardiovascular:      Rate and Rhythm: Normal rate and regular rhythm.      Heart sounds: Normal heart sounds.   Pulmonary:      Effort: Pulmonary effort is normal.      Breath sounds: Normal breath sounds.   Abdominal:      General: Bowel sounds are normal.      Palpations: Abdomen is soft.   Musculoskeletal:         General: Normal range of motion.      Cervical back: Normal range of motion.   Skin:     General: Skin is warm and dry.   Neurological:      Mental Status: He is alert and oriented to person, place, and time.   Psychiatric:         Mood and Affect: Mood normal.         Behavior: Behavior normal.

## 2025-04-05 DIAGNOSIS — J30.1 SEASONAL ALLERGIC RHINITIS DUE TO POLLEN: ICD-10-CM

## 2025-04-07 RX ORDER — FLUTICASONE PROPIONATE 50 MCG
SPRAY, SUSPENSION (ML) NASAL
Qty: 48 ML | Refills: 1 | Status: SHIPPED | OUTPATIENT
Start: 2025-04-07
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
## 2025-06-08 RX ORDER — DOXYCYCLINE HYCLATE 100 MG
100 TABLET ORAL 2 TIMES DAILY
Qty: 14 TABLET | Refills: 0 | Status: SHIPPED | OUTPATIENT
Start: 2025-06-08 | End: 2025-06-15

## 2025-07-01 ENCOUNTER — OFFICE VISIT (OUTPATIENT)
Dept: FAMILY MEDICINE CLINIC | Facility: CLINIC | Age: 50
End: 2025-07-01
Payer: COMMERCIAL

## 2025-07-01 VITALS
BODY MASS INDEX: 28.31 KG/M2 | DIASTOLIC BLOOD PRESSURE: 70 MMHG | HEIGHT: 72 IN | SYSTOLIC BLOOD PRESSURE: 126 MMHG | WEIGHT: 209 LBS

## 2025-07-01 DIAGNOSIS — K59.00 CONSTIPATION, UNSPECIFIED CONSTIPATION TYPE: Primary | ICD-10-CM

## 2025-07-01 PROCEDURE — 99214 OFFICE O/P EST MOD 30 MIN: CPT | Performed by: FAMILY MEDICINE

## 2025-07-01 NOTE — PROGRESS NOTES
Name: Jagjit Elmore      : 1975      MRN: [id number]  Encounter Provider: ELSI Salazar  Encounter Date: 2025   Encounter department: [facility] [address]  :  Assessment & Plan  Constipation, unspecified constipation type  Resolved per report, stressed the importance of proper hydration throughout the day, reviewed and discussed bowel regimen recommend Metamucil increasing fiber intake call for any changes                History of Present Illness   C/o blood in stool   Noted in toilet and paper   After large stool passage   Negative clot noted, negative fever chills abdominal pain  Admits has been less than adequate in fluid intake , metamucil   Over all has improved from onset, presently asymptomatic as of this morning  Mtn bike has laid off for awhile     Black or Bloody Stool  Pertinent negatives include no abdominal pain, arthralgias, chest pain, chills, congestion, coughing, fever, headaches, joint swelling, myalgias, nausea, numbness, rash, sore throat, vomiting or weakness.     Review of Systems   Constitutional:  Negative for appetite change, chills, fever and unexpected weight change.   HENT:  Negative for congestion, dental problem, ear pain, hearing loss, postnasal drip, rhinorrhea, sinus pressure, sinus pain, sneezing, sore throat, tinnitus and voice change.    Eyes:  Negative for visual disturbance.   Respiratory:  Negative for apnea, cough, chest tightness and shortness of breath.    Cardiovascular:  Negative for chest pain, palpitations and leg swelling.   Gastrointestinal:  Positive for blood in stool, constipation and rectal pain. Negative for abdominal distention, abdominal pain, diarrhea, nausea and vomiting.   Endocrine: Negative for cold intolerance, heat intolerance, polydipsia, polyphagia and polyuria.   Genitourinary:  Negative for decreased urine volume, difficulty urinating, dysuria, frequency and hematuria.   Musculoskeletal:   Negative for arthralgias, back pain, gait problem, joint swelling and myalgias.   Skin:  Negative for color change, rash and wound.   Allergic/Immunologic: Negative for environmental allergies and food allergies.   Neurological:  Negative for dizziness, syncope, weakness, light-headedness, numbness and headaches.   Hematological:  Negative for adenopathy. Does not bruise/bleed easily.   Psychiatric/Behavioral:  Negative for sleep disturbance and suicidal ideas. The patient is not nervous/anxious.        Objective   /70   Ht 6' (1.829 m)   Wt 94.8 kg (209 lb)   BMI 28.35 kg/m²      Physical Exam  Constitutional:       General: He is not in acute distress.     Appearance: He is well-developed. He is not ill-appearing or toxic-appearing.   HENT:      Head: Normocephalic and atraumatic.     Eyes:      General: No scleral icterus.      Cardiovascular:      Rate and Rhythm: Normal rate and regular rhythm.      Heart sounds: Normal heart sounds.   Pulmonary:      Effort: Pulmonary effort is normal.      Breath sounds: Normal breath sounds.   Abdominal:      General: Abdomen is flat. Bowel sounds are normal. There is no distension.      Palpations: Abdomen is soft. There is no mass.      Tenderness: There is no abdominal tenderness.   Genitourinary:     Rectum: Normal. Guaiac result negative.     Musculoskeletal:         General: Normal range of motion.      Cervical back: Normal range of motion and neck supple.     Skin:     General: Skin is warm and dry.     Neurological:      Mental Status: He is alert and oriented to person, place, and time.      Deep Tendon Reflexes: Reflexes are normal and symmetric.     Psychiatric:         Behavior: Behavior normal.         Thought Content: Thought content normal.         Judgment: Judgment normal.